# Patient Record
Sex: MALE | Race: BLACK OR AFRICAN AMERICAN | Employment: UNEMPLOYED | URBAN - METROPOLITAN AREA
[De-identification: names, ages, dates, MRNs, and addresses within clinical notes are randomized per-mention and may not be internally consistent; named-entity substitution may affect disease eponyms.]

---

## 2019-03-06 ENCOUNTER — HOSPITAL ENCOUNTER (INPATIENT)
Age: 49
LOS: 5 days | Discharge: HOME | DRG: 750 | End: 2019-03-11
Attending: EMERGENCY MEDICINE | Admitting: PSYCHIATRY & NEUROLOGY
Payer: MEDICAID

## 2019-03-06 DIAGNOSIS — F20.9 SCHIZOPHRENIA, UNSPECIFIED TYPE (HCC): Primary | ICD-10-CM

## 2019-03-06 LAB
AMPHET UR QL SCN: NEGATIVE
ANION GAP SERPL CALC-SCNC: 8 MMOL/L (ref 5–15)
APPEARANCE UR: CLEAR
ATRIAL RATE: 66 BPM
BACTERIA URNS QL MICRO: NEGATIVE /HPF
BARBITURATES UR QL SCN: NEGATIVE
BASOPHILS # BLD: 0.1 K/UL (ref 0–0.1)
BASOPHILS NFR BLD: 1 % (ref 0–1)
BENZODIAZ UR QL: NEGATIVE
BILIRUB UR QL CFM: NEGATIVE
BUN SERPL-MCNC: 8 MG/DL (ref 6–20)
BUN/CREAT SERPL: 8 (ref 12–20)
CALCIUM SERPL-MCNC: 8.5 MG/DL (ref 8.5–10.1)
CALCULATED P AXIS, ECG09: 78 DEGREES
CALCULATED R AXIS, ECG10: 79 DEGREES
CALCULATED T AXIS, ECG11: 74 DEGREES
CANNABINOIDS UR QL SCN: POSITIVE
CHLORIDE SERPL-SCNC: 101 MMOL/L (ref 97–108)
CO2 SERPL-SCNC: 30 MMOL/L (ref 21–32)
COCAINE UR QL SCN: POSITIVE
COLOR UR: ABNORMAL
CREAT SERPL-MCNC: 0.97 MG/DL (ref 0.7–1.3)
DIAGNOSIS, 93000: NORMAL
DIFFERENTIAL METHOD BLD: NORMAL
DRUG SCRN COMMENT,DRGCM: ABNORMAL
EOSINOPHIL # BLD: 0.2 K/UL (ref 0–0.4)
EOSINOPHIL NFR BLD: 3 % (ref 0–7)
EPITH CASTS URNS QL MICRO: ABNORMAL /LPF
ERYTHROCYTE [DISTWIDTH] IN BLOOD BY AUTOMATED COUNT: 14.4 % (ref 11.5–14.5)
ETHANOL SERPL-MCNC: <10 MG/DL
GLUCOSE SERPL-MCNC: 69 MG/DL (ref 65–100)
GLUCOSE UR STRIP.AUTO-MCNC: NEGATIVE MG/DL
HCT VFR BLD AUTO: 47 % (ref 36.6–50.3)
HGB BLD-MCNC: 15.5 G/DL (ref 12.1–17)
HGB UR QL STRIP: NEGATIVE
IMM GRANULOCYTES # BLD AUTO: 0 K/UL (ref 0–0.04)
IMM GRANULOCYTES NFR BLD AUTO: 0 % (ref 0–0.5)
KETONES UR QL STRIP.AUTO: ABNORMAL MG/DL
LEUKOCYTE ESTERASE UR QL STRIP.AUTO: NEGATIVE
LYMPHOCYTES # BLD: 1.3 K/UL (ref 0.8–3.5)
LYMPHOCYTES NFR BLD: 23 % (ref 12–49)
MCH RBC QN AUTO: 27.9 PG (ref 26–34)
MCHC RBC AUTO-ENTMCNC: 33 G/DL (ref 30–36.5)
MCV RBC AUTO: 84.7 FL (ref 80–99)
METHADONE UR QL: NEGATIVE
MONOCYTES # BLD: 0.6 K/UL (ref 0–1)
MONOCYTES NFR BLD: 10 % (ref 5–13)
NEUTS SEG # BLD: 3.7 K/UL (ref 1.8–8)
NEUTS SEG NFR BLD: 63 % (ref 32–75)
NITRITE UR QL STRIP.AUTO: NEGATIVE
NRBC # BLD: 0 K/UL (ref 0–0.01)
NRBC BLD-RTO: 0 PER 100 WBC
OPIATES UR QL: NEGATIVE
P-R INTERVAL, ECG05: 168 MS
PCP UR QL: NEGATIVE
PH UR STRIP: 6 [PH] (ref 5–8)
PLATELET # BLD AUTO: 248 K/UL (ref 150–400)
PMV BLD AUTO: 10.4 FL (ref 8.9–12.9)
POTASSIUM SERPL-SCNC: 3.4 MMOL/L (ref 3.5–5.1)
PROT UR STRIP-MCNC: ABNORMAL MG/DL
Q-T INTERVAL, ECG07: 418 MS
QRS DURATION, ECG06: 80 MS
QTC CALCULATION (BEZET), ECG08: 438 MS
RBC # BLD AUTO: 5.55 M/UL (ref 4.1–5.7)
RBC #/AREA URNS HPF: ABNORMAL /HPF (ref 0–5)
SODIUM SERPL-SCNC: 139 MMOL/L (ref 136–145)
SP GR UR REFRACTOMETRY: 1.02 (ref 1–1.03)
UA: UC IF INDICATED,UAUC: ABNORMAL
UROBILINOGEN UR QL STRIP.AUTO: 1 EU/DL (ref 0.2–1)
VENTRICULAR RATE, ECG03: 66 BPM
WBC # BLD AUTO: 5.8 K/UL (ref 4.1–11.1)
WBC URNS QL MICRO: ABNORMAL /HPF (ref 0–4)

## 2019-03-06 PROCEDURE — 85025 COMPLETE CBC W/AUTO DIFF WBC: CPT

## 2019-03-06 PROCEDURE — 80307 DRUG TEST PRSMV CHEM ANLYZR: CPT

## 2019-03-06 PROCEDURE — 36415 COLL VENOUS BLD VENIPUNCTURE: CPT

## 2019-03-06 PROCEDURE — 90791 PSYCH DIAGNOSTIC EVALUATION: CPT

## 2019-03-06 PROCEDURE — 74011250637 HC RX REV CODE- 250/637: Performed by: PSYCHIATRY & NEUROLOGY

## 2019-03-06 PROCEDURE — 65220000003 HC RM SEMIPRIVATE PSYCH

## 2019-03-06 PROCEDURE — 93005 ELECTROCARDIOGRAM TRACING: CPT

## 2019-03-06 PROCEDURE — 99285 EMERGENCY DEPT VISIT HI MDM: CPT

## 2019-03-06 PROCEDURE — 80048 BASIC METABOLIC PNL TOTAL CA: CPT

## 2019-03-06 PROCEDURE — 81001 URINALYSIS AUTO W/SCOPE: CPT

## 2019-03-06 RX ORDER — LORAZEPAM 1 MG/1
1 TABLET ORAL
Status: DISCONTINUED | OUTPATIENT
Start: 2019-03-06 | End: 2019-03-11 | Stop reason: HOSPADM

## 2019-03-06 RX ORDER — OLANZAPINE 5 MG/1
5 TABLET ORAL
Status: DISCONTINUED | OUTPATIENT
Start: 2019-03-06 | End: 2019-03-11 | Stop reason: HOSPADM

## 2019-03-06 RX ORDER — BENZTROPINE MESYLATE 1 MG/ML
2 INJECTION INTRAMUSCULAR; INTRAVENOUS
Status: DISCONTINUED | OUTPATIENT
Start: 2019-03-06 | End: 2019-03-11 | Stop reason: HOSPADM

## 2019-03-06 RX ORDER — IBUPROFEN 200 MG
1 TABLET ORAL
Status: DISCONTINUED | OUTPATIENT
Start: 2019-03-06 | End: 2019-03-11 | Stop reason: HOSPADM

## 2019-03-06 RX ORDER — LORAZEPAM 2 MG/ML
2 INJECTION INTRAMUSCULAR
Status: DISCONTINUED | OUTPATIENT
Start: 2019-03-06 | End: 2019-03-11 | Stop reason: HOSPADM

## 2019-03-06 RX ORDER — ACETAMINOPHEN 325 MG/1
650 TABLET ORAL
Status: DISCONTINUED | OUTPATIENT
Start: 2019-03-06 | End: 2019-03-11 | Stop reason: HOSPADM

## 2019-03-06 RX ORDER — IBUPROFEN 400 MG/1
400 TABLET ORAL
Status: DISCONTINUED | OUTPATIENT
Start: 2019-03-06 | End: 2019-03-11 | Stop reason: HOSPADM

## 2019-03-06 RX ORDER — BENZTROPINE MESYLATE 2 MG/1
2 TABLET ORAL
Status: DISCONTINUED | OUTPATIENT
Start: 2019-03-06 | End: 2019-03-11 | Stop reason: HOSPADM

## 2019-03-06 RX ORDER — ADHESIVE BANDAGE
30 BANDAGE TOPICAL DAILY PRN
Status: DISCONTINUED | OUTPATIENT
Start: 2019-03-06 | End: 2019-03-11 | Stop reason: HOSPADM

## 2019-03-06 RX ORDER — ZOLPIDEM TARTRATE 10 MG/1
10 TABLET ORAL
Status: DISCONTINUED | OUTPATIENT
Start: 2019-03-06 | End: 2019-03-07

## 2019-03-06 RX ADMIN — ZOLPIDEM TARTRATE 10 MG: 10 TABLET ORAL at 22:45

## 2019-03-06 NOTE — ED NOTES
Pt presents to ED by EMS with c/o hearing voices. Pt reports the voices are saying \"harm yourself\" and \"if no one cares about you just go ahead and do it. \"  Pt reports he has a plan to harm himself using a knife. +SI/-HI  Denies alcohol use but reports using cocaine and marijuana 5 days ago. Reports visual and tactile hallucinations. Reports seeing mermaids, unicorns and \"a lot of things. \"   Pt reports being homeless. Previously resided in apartments off [address] with his ex brother in law and his friend. Reported having issues with the friend saying Nataliya Christopher has my TV and money. He better give it back or ill raise any measures to bust his ass when I get out of here. \" pt reports having two sisters who live locally and one brother who lives out of state. Minimal communication/support from family. Pt receives mental health care at [facility]. Reports taking haldol but wants his dose increased. Pt is alert and cooperative. Shows loose associations and tangential thinking making it challenging to get a detailed history. Pt asked me if I had a knife and then changed to talking about losing his money. Pt belongings were checked and he was placed in a gown without difficulty. Emergency Department Nursing Plan of Care       The Nursing Plan of Care is developed from the Nursing assessment and Emergency Department Attending provider initial evaluation. The plan of care may be reviewed in the ED Provider note.     The Plan of Care was developed with the following considerations:   Patient / Family readiness to learn indicated by:verbalized understanding  Persons(s) to be included in education: patient  Barriers to Learning/Limitations:No    Signed     Guido Fitzgerald    3/6/2019   2:02 PM

## 2019-03-06 NOTE — BSMART NOTE
Comprehensive Assessment Form Part 1      Section I - Disposition    Axis I - Paranoid Schizophrenia, Substance Use Disorder (Alcohol, Marijuana, Cocaine)   Axis II - Deferred  Axis III -   Past Medical History:   Diagnosis Date    Aggressive outburst     Anxiety disorder     Brain tumor ([address]     Akilah ([address]     Other ill-defined conditions(799.89)     Psychotic disorder ([address]     Self mutilation     Substance abuse ([address]     Suicidal thoughts        Axis IV - Medication noncompliance, homeless, substance abuse  [place] [address] - 35      The Medical Doctor to Psychiatrist conference was not completed. The Medical Doctor is in agreement with Psychiatrist disposition because of (reason) Admission is recommended. The plan is admit to [facility]. The on-call Psychiatrist consulted was Dr. Ladona Holstein. The admitting Psychiatrist will be MONIQUE  The admitting Diagnosis is Schizophrenia and Substance Use Disorder. The Payor source is  Medicaid. Section II - Integrated Summary  Summary:  Patient came in from [facility] from [facility] for hallucinations including voices to harm himself with a knife. Patient also reported using cocaine and THC about 5 days ago. Per  at [facility] patient arrived agitated, hallucinating, and suicidal. He was prescreened by crisis per  and was voluntary for admission. Patient is a difficult historian but reported his last admission was in November [facility] Patient is alert and oriented. Patient's speech is tangential and eye contact is poor. Patient describing a visual hallucination when this clinician came in the room about a white girl with a white skates with blonde hair going to a black care to get keys and going to a police station and across a yard and into a child's bookbag. Patient is responding to internal stimuli in the room and laughing out loud commenting about what he is experiencing.   Patient indicated his current medications were Haldol and a \"sleeping pill.\"  He indicated he is not taking his medication at this time and it has been months. Patient reported poor sleep, poor appetite, hallucinations suicidal ideation with various plans. Patient reported he could use a knife to slit his throat, run from a , or do drugs to kill himself. Patient also reported he is homicidal towards God and wants to clip his wings like [employer]. Patient agrees to hospitalization to resume medications. Per  he is supposed to be on Haldol 50 mg IM and Remeron 15 mg qhs. Patient has doctors appointment at [facility] on 3-12-19. The patienthas demonstrated mental capacity to provide informed consent. The information is given by the patient and past medical records. The Chief Complaint is hallucinations and substance. The Precipitant Factors are medication noncompliance. Previous Hospitalizations: Yes  Current Psychiatrist and/or  is Trinidad Llanos     Lethality Assessment:    The potential for suicide noted by the following: active psychosis, defined plan, ideation and current substance abuse . The potential for homicide is noted by the following : psychosis and ideation. Patient stated he wanted to hurt God by clipping his wings off. The patient has not been a perpetrator of sexual or physical abuse. There are pending charges and are listed as: drunk in public. The patient is felt to be at risk for self harm or harm to others. The attending nurse was advised that security has been notified. Section III - Psychosocial  The patient's overall mood and attitude is anxious, agitated. Feelings of helplessness and hopelessness are not observed. Generalized anxiety is not observed. Panic is not observed. Phobias are not observed. Obsessive compulsive tendencies are not observed. Section IV - Mental Status Exam  The patient's appearance is tense. The patient's behavior is restless.  The patient is oriented to time, place, person and situation. The patient's speech is loud at times. The patient's mood is anxious and is irritable. The range of affect is labile. The patient's thought content demonstrates delusions. The thought process is blocking and is tangential.  The patient's perception demonstrated changes in the following:  auditory  visual. The patient's memory shows no evidence of impairment. The patient's appetite is decreased and shows signs of weight loss. The patient's sleep has evidence of insomnia. The patient shows no insight. The patient's judgement is psychologically impaired. Section V - Substance Abuse  The patient is using substances. The patient is using alcohol for with last use on unknown, cannabis by inhalation  with last use on 5 days ago and cocaine by inhalation  } with last use on 5 days ago. The patient has experienced the following withdrawal symptoms: N/A. Section VI - Living Arrangements  The patient is single. The patient lives alone. The patient has no children. The patient does plan to return home upon discharge. The patient does have legal issues pending. The patient's source of income comes from disability. Catholic and cultural practices have not been voiced at this time. The patient's greatest support comes from none reported and this person will not be involved with the treatment. The patient has not been in an event described as horrible or outside the realm of ordinary life experience either currently or in the past.  The patient has not been a victim of sexual/physical abuse. Section VII - Other Areas of Clinical Concern  The highest grade achieved is NA with the overall quality of school experience being described as NA. The patient is currently disabled and speaks [place] as a primary language. The patient has no communication impairments affecting communication. The patient's preference for learning can be described as: can read and write adequately.   The patient's hearing is normal.  The patient's vision is normal.      [facility], [facility]

## 2019-03-06 NOTE — ED NOTES
While performing EKG with BSMART at bedside, pt seemed distracted and agitated. Pt started talking about angels.

## 2019-03-06 NOTE — ED NOTES
TRANSFER - OUT REPORT:    Verbal report given to Krysta RN(name) on César Boucher  being transferred to behavioral health room 309(unit) for routine progression of care       Report consisted of patients Situation, Background, Assessment and   Recommendations(SBAR). Information from the following report(s) SBAR was reviewed with the receiving nurse. Lines:       Opportunity for questions and clarification was provided.       Patient transported with:   security

## 2019-03-06 NOTE — ED PROVIDER NOTES
EMERGENCY DEPARTMENT HISTORY AND PHYSICAL EXAM      Date: 3/6/2019  Patient Name: Emory Anderson    History of Presenting Illness     Chief Complaint   Patient presents with    Mental Health Problem       History Provided By: Patient    HPI: Emory Anderson, 53 y.o. male with PMHx significant for brain tumor, anxiety, self mutilation, substance abuse, sciozphrenia presents ambulance to the ED with cc of hearing voices that are telling him to harm himself. Pt reports a plan of \"using a knife\". Denies HI. Reports he was admitted to inpatient psych in 11/2018 - has not taken psych meds since. Pt admits to occasional alcohol use. Also admits to marijuana and cocaine use. Denies heroin use. Reports trouble sleeping. Denies change in appetite. Pt reports he has been under increased stress recently. Has not f/u psych recently. There are no other complaints, changes, or physical findings at this time. PCP: Dago Albrecht MD    No current facility-administered medications on file prior to encounter. Current Outpatient Medications on File Prior to Encounter   Medication Sig Dispense Refill    cephALEXin (KEFLEX) 500 mg capsule Take 500 mg by mouth four (4) times daily.  HYDROcodone-acetaminophen (NORCO) 5-325 mg per tablet Take 1-2 Tabs by mouth every four (4) hours as needed for Pain.  40 Tab 1       Past History     Past Medical History:  Past Medical History:   Diagnosis Date    Aggressive outburst     Anxiety disorder     Brain tumor ([address]     Akilah ([address]     Other ill-defined conditions(799.89)     Psychotic disorder ([address]     Self mutilation     Substance abuse ([facility]     Suicidal thoughts        Past Surgical History:  Past Surgical History:   Procedure Laterality Date    HX OTHER SURGICAL      brain tumor removal       Family History:  Family History   Problem Relation Age of Onset    Depression Neg Hx     Suicide Neg Hx     Psychotic Disorder Neg Hx     Substance Abuse Neg Hx  Dementia Neg Hx        Social History:  Social History     Tobacco Use    Smoking status: Current Every Day Smoker     Packs/day: 1.50   Substance Use Topics    Alcohol use: Yes     Comment: Beer    Drug use: No       Allergies:  No Known Allergies      Review of Systems   Review of Systems   Constitutional: Negative for chills and fever. HENT: Negative for facial swelling. Eyes: Negative for photophobia and visual disturbance. Respiratory: Negative for shortness of breath. Cardiovascular: Negative for chest pain. Gastrointestinal: Negative for abdominal pain, nausea and vomiting. Genitourinary: Negative for flank pain. Musculoskeletal: Negative for back pain and myalgias. Skin: Negative for color change, pallor, rash and wound. Neurological: Negative for dizziness, weakness, light-headedness and headaches. Psychiatric/Behavioral: Positive for hallucinations, sleep disturbance and suicidal ideas. Negative for agitation, behavioral problems, confusion, decreased concentration, dysphoric mood and self-injury. The patient is not nervous/anxious and is not hyperactive. All other systems reviewed and are negative. Physical Exam   Physical Exam   Constitutional: He is oriented to person, place, and time. He appears well-developed and well-nourished. No distress. HENT:   Head: Normocephalic and atraumatic. Eyes: Conjunctivae are normal.   Cardiovascular: Normal rate, regular rhythm and normal heart sounds. Pulmonary/Chest: Effort normal and breath sounds normal. No respiratory distress. Abdominal: Soft. Bowel sounds are normal. He exhibits no distension. Musculoskeletal: Normal range of motion. Neurological: He is alert and oriented to person, place, and time. Skin: Skin is warm. No rash noted. Psychiatric: He has a normal mood and affect. His behavior is normal. His speech is not rapid and/or pressured. He expresses suicidal ideation. He expresses no homicidal ideation. He expresses suicidal plans. He expresses no homicidal plans. Linear thoughts   Nursing note and vitals reviewed. Diagnostic Study Results     Labs -     Recent Results (from the past 12 hour(s))   URINALYSIS W/ REFLEX CULTURE    Collection Time: 03/06/19  2:08 PM   Result Value Ref Range    Color DARK YELLOW      Appearance CLEAR CLEAR      Specific gravity 1.020 1.003 - 1.030      pH (UA) 6.0 5.0 - 8.0      Protein TRACE (A) NEG mg/dL    Glucose NEGATIVE  NEG mg/dL    Ketone TRACE (A) NEG mg/dL    Blood NEGATIVE  NEG      Urobilinogen 1.0 0.2 - 1.0 EU/dL    Nitrites NEGATIVE  NEG      Leukocyte Esterase NEGATIVE  NEG      WBC 0-4 0 - 4 /hpf    RBC 0-5 0 - 5 /hpf    Epithelial cells FEW FEW /lpf    Bacteria NEGATIVE  NEG /hpf    UA:UC IF INDICATED CULTURE NOT INDICATED BY UA RESULT CNI     DRUG SCREEN, URINE    Collection Time: 03/06/19  2:08 PM   Result Value Ref Range    AMPHETAMINES NEGATIVE  NEG      BARBITURATES NEGATIVE  NEG      BENZODIAZEPINES NEGATIVE  NEG      COCAINE POSITIVE (A) NEG      METHADONE NEGATIVE  NEG      OPIATES NEGATIVE  NEG      PCP(PHENCYCLIDINE) NEGATIVE  NEG      THC (TH-CANNABINOL) POSITIVE (A) NEG      Drug screen comment (NOTE)    BILIRUBIN, CONFIRM    Collection Time: 03/06/19  2:08 PM   Result Value Ref Range    Bilirubin UA, confirm NEGATIVE  NEG     CBC WITH AUTOMATED DIFF    Collection Time: 03/06/19  2:11 PM   Result Value Ref Range    WBC 5.8 4.1 - 11.1 K/uL    RBC 5.55 4.10 - 5.70 M/uL    HGB 15.5 12.1 - 17.0 g/dL    HCT 47.0 36.6 - 50.3 %    MCV 84.7 80.0 - 99.0 FL    MCH 27.9 26.0 - 34.0 PG    MCHC 33.0 30.0 - 36.5 g/dL    RDW 14.4 11.5 - 14.5 %    PLATELET 460 619 - 849 K/uL    MPV 10.4 8.9 - 12.9 FL    NRBC 0.0 0  WBC    ABSOLUTE NRBC 0.00 0.00 - 0.01 K/uL    NEUTROPHILS 63 32 - 75 %    LYMPHOCYTES 23 12 - 49 %    MONOCYTES 10 5 - 13 %    EOSINOPHILS 3 0 - 7 %    BASOPHILS 1 0 - 1 %    IMMATURE GRANULOCYTES 0 0.0 - 0.5 %    ABS.  NEUTROPHILS 3.7 1.8 - 8.0 K/UL    ABS. LYMPHOCYTES 1.3 0.8 - 3.5 K/UL    ABS. MONOCYTES 0.6 0.0 - 1.0 K/UL    ABS. EOSINOPHILS 0.2 0.0 - 0.4 K/UL    ABS. BASOPHILS 0.1 0.0 - 0.1 K/UL    ABS. IMM. GRANS. 0.0 0.00 - 0.04 K/UL    DF AUTOMATED     METABOLIC PANEL, BASIC    Collection Time: 03/06/19  2:11 PM   Result Value Ref Range    Sodium 139 136 - 145 mmol/L    Potassium 3.4 (L) 3.5 - 5.1 mmol/L    Chloride 101 97 - 108 mmol/L    CO2 30 21 - 32 mmol/L    Anion gap 8 5 - 15 mmol/L    Glucose 69 65 - 100 mg/dL    BUN 8 6 - 20 MG/DL    Creatinine 0.97 0.70 - 1.30 MG/DL    BUN/Creatinine ratio 8 (L) 12 - 20      GFR est AA >60 >60 ml/min/1.73m2    GFR est non-AA >60 >60 ml/min/1.73m2    Calcium 8.5 8.5 - 10.1 MG/DL   ETHYL ALCOHOL    Collection Time: 03/06/19  2:11 PM   Result Value Ref Range    ALCOHOL(ETHYL),SERUM <10 <10 MG/DL   EKG, 12 LEAD, INITIAL    Collection Time: 03/06/19  3:12 PM   Result Value Ref Range    Ventricular Rate 66 BPM    Atrial Rate 66 BPM    P-R Interval 168 ms    QRS Duration 80 ms    Q-T Interval 418 ms    QTC Calculation (Bezet) 438 ms    Calculated P Axis 78 degrees    Calculated R Axis 79 degrees    Calculated T Axis 74 degrees    Diagnosis       Normal sinus rhythm  Normal ECG  No previous ECGs available         Radiologic Studies -   No orders to display     CT Results  (Last 48 hours)    None        CXR Results  (Last 48 hours)    None            Medical Decision Making   I am the first provider for this patient. I reviewed the vital signs, available nursing notes, past medical history, past surgical history, family history and social history. Vital Signs-Reviewed the patient's vital signs. Patient Vitals for the past 12 hrs:   Temp Pulse Resp BP SpO2   03/06/19 1707 98.3 °F (36.8 °C) 88 14 102/53 98 %   03/06/19 1334 98.3 °F (36.8 °C) 82 20 120/81 98 %       Pulse Oximetry Analysis - 94% on RA      EKG interpretation: (Preliminary)  Rhythm: normal sinus rhythm; and regular .  Rate (approx.): 66; Axis: normal; HI interval: normal; QRS interval: normal ; ST/T wave: normal; Other findings: normal.    Records Reviewed: Nursing Notes, Old Medical Records and Previous electrocardiograms    Provider Notes (Medical Decision Making):   DDx: Psychosis, SI, Polysubstance abuse, Medication noncompliance    ED Course:   Initial assessment performed. The patients presenting problems have been discussed, and they are in agreement with the care plan formulated and outlined with them. I have encouraged them to ask questions as they arise throughout their visit. Ayesha evaluated pt and spoke with psychiatrist on call. He agreed to admit pt to inpatient psych at [facility]    Disposition:  Pt admitted to inpatient psych at [facility]. PLAN:  1. Current Discharge Medication List        2. Follow-up Information    None       Return to ED if worse     Diagnosis     Clinical Impression:   1.  Schizophrenia, unspecified type ([facility] [address] [facility]

## 2019-03-07 PROCEDURE — 65220000003 HC RM SEMIPRIVATE PSYCH

## 2019-03-07 RX ORDER — TRAZODONE HYDROCHLORIDE 100 MG/1
100 TABLET ORAL
Status: DISCONTINUED | OUTPATIENT
Start: 2019-03-07 | End: 2019-03-11 | Stop reason: HOSPADM

## 2019-03-07 RX ORDER — MIRTAZAPINE 15 MG/1
15 TABLET, FILM COATED ORAL
Status: DISCONTINUED | OUTPATIENT
Start: 2019-03-07 | End: 2019-03-11 | Stop reason: HOSPADM

## 2019-03-07 RX ORDER — HALOPERIDOL 5 MG/1
5 TABLET ORAL EVERY 12 HOURS
Status: DISCONTINUED | OUTPATIENT
Start: 2019-03-07 | End: 2019-03-10

## 2019-03-07 NOTE — PROGRESS NOTES
Problem: Psychosis  Goal: *STG: Remains safe in hospital  Outcome: Progressing Towards Goal  Patient continues to be monitored q 15 miniutes.

## 2019-03-07 NOTE — PROGRESS NOTES
[facility] Admission Pharmacy Medication Reconciliation     Recommendations/Findings:   1) Per chart review, patient hasn't taken medications in months. Spoke with pharmacist at [facility] who stated last haloperidol decanoate 50 mg IM injection was 8/7/18 (dose was 50 mg IM every 4 weeks). Patient was also receiving mirtazapine 15 mg QHS at that time as well. Unable to reach patient's  at this time for corroboration. The following changes were made to the PTA medication list:    Additions: None   Modifications: None   Deletions: cephalexin, hydrocodone-acetaminophen    Total Time Spent: 10 minutes     Information obtained from: Chart review, [facility] @ [facility] ([phone])     Patient allergies:    Allergies as of 03/06/2019    (No Known Allergies)       Prior to admission medications:   None       Thank you,  DENYS Hutchinson, [facility]  [phone]

## 2019-03-07 NOTE — H&P
INITIAL PSYCHIATRIC EVALUATION            IDENTIFICATION:    Patient Name  Yenni So   Date of Birth 1970   [facility] [id number]   Medical Record Number  [id number]      Age  53 y.o. PCP Slade Tate MD   Admit date:  3/6/2019    Room Number  402/98  @ [facility]   Date of Service  3/7/2019            HISTORY         REASON FOR HOSPITALIZATION:  CC: \"psychosis\". Pt admitted under a voluntary basis for severe psychosis proving to be an imminent danger to self and an inability to care for self. HISTORY OF PRESENT ILLNESS:    The patient, Yenni So, is a 53 y.o. BLACK OR  male with a past psychiatric history significant for schizophrenia, who presents at this time with complaints of (and/or evidence of) the following emotional symptoms: delusions and psychosis. Additional symptomatology include flight of ideas. The above symptoms have been present for 2+ weeks. These symptoms are of moderate to high severity. These symptoms are constant in nature. The patient's condition has been precipitated by psychosocial stressors. Patient's condition made worse by continued illicit drug use and treatment noncompliance. UDS: +cocaine, THC; BAL=0. The patient is a poor historian. The patient does not corroborate the above narrative. The patient contracts for safety on the unit and gives consent for the team to contact collateral. The patient is amenable to initiating treatment while on the unit. Patient seen in his room where he is coherent, but preoccupied with coloring a kimberli shirt but reports taking Haldol and Remeron. Per staff patient has been isolative and irritable, no PRNs given for behavior, got Ambien for sleep. ALLERGIES: No Known Allergies   MEDICATIONS PRIOR TO ADMISSION:   No medications prior to admission.       PAST MEDICAL HISTORY:   Past Medical History:   Diagnosis Date    Aggressive outburst     Anxiety disorder     [facility] tumor ([facility] [address]     [place]il[facility] [address]     Other ill-defined conditions(799.89)     Psychotic disorder ([facility] [facility]     Self mutilation     [facility]     Suicidal thoughts      Past Surgical History:   Procedure Laterality Date    HX OTHER SURGICAL      brain tumor removal      SOCIAL HISTORY:   Social History     Socioeconomic History    Marital status: UNKNOWN     Spouse name: Not on file    Number of children: Not on file    Years of education: Not on file    Highest education level: Not on file   Social Needs    Financial resource strain: Not on file    Food insecurity - worry: Not on file    Food insecurity - inability: Not on file   [employer] needs - medical: Not on file   [employer] needs - non-medical: Not on file   Occupational History    Not on file   Tobacco Use    Smoking status: Current Every Day Smoker     Packs/day: 1.50   Substance and Sexual Activity    Alcohol use: Yes     Comment: Beer    Drug use: No    Sexual activity: Not on file   Other Topics Concern    Caffeine Concern Not Asked    Back Care Not Asked    Exercise Not Asked    Occupational Exposure Not Asked    Sleep Concern Not Asked    Stress Concern Not Asked    Weight Concern Not Asked   Social History Narrative    Not on file      FAMILY HISTORY: History reviewed, pertinent family history as below:   Family History   Problem Relation Age of Onset    Depression Neg Hx     Suicide Neg Hx     Psychotic Disorder Neg Hx     Substance Abuse Neg Hx     Dementia Neg Hx        REVIEW OF SYSTEMS:   Pertinent items are noted in the History of Present Illness. All other Systems reviewed and are considered negative.            MENTAL STATUS EXAM & VITALS     MENTAL STATUS EXAM (MSE):    MSE FINDINGS ARE WITHIN NORMAL LIMITS (WNL) UNLESS OTHERWISE STATED BELOW. ( ALL OF THE BELOW CATEGORIES OF THE MSE HAVE BEEN REVIEWED (reviewed 3/7/2019) AND UPDATED AS DEEMED APPROPRIATE )  General Presentation age appropriate, cooperative and vague   Orientation oriented to time, place and person   Vital Signs  See below (reviewed 3/7/2019); Vital Signs (BP, Pulse, & Temp) are within normal limits if not listed below.    Gait and Station Stable/steady, no ataxia   Musculoskeletal System No extrapyramidal symptoms (EPS); no abnormal muscular movements or Tardive Dyskinesia (TD); muscle strength and tone are within normal limits   Language No aphasia or dysarthria   Speech:  normal volume and non-pressured   Thought Processes disorganized; normal rate of thoughts; poor abstract reasoning/computation   Thought Associations loose associations   Thought Content preoccupations   Suicidal Ideations none   Homicidal Ideations none   Mood:  euthymic   Affect:  irritable   Memory recent  intact   Memory remote:  intact   Concentration/Attention:  intact   Fund of Knowledge average   Insight:  poor   Reliability poor   Judgment:  poor          VITALS:     Patient Vitals for the past 24 hrs:   Temp Pulse BP SpO2   03/07/19 0750  89 129/88 98 %   03/06/19 1959 98.1 °F (36.7 °C) 86 108/61 98 %     Wt Readings from Last 3 Encounters:   03/06/19 65.8 kg (145 lb)   04/16/12 69.4 kg (153 lb)   11/15/10 69.9 kg (154 lb)     Temp Readings from Last 3 Encounters:   03/06/19 98.1 °F (36.7 °C)   11/19/10 96.7 °F (35.9 °C)   11/12/10 97.3 °F (36.3 °C)     BP Readings from Last 3 Encounters:   03/07/19 129/88   04/16/12 106/73   11/19/10 104/65     Pulse Readings from Last 3 Encounters:   03/07/19 89   04/16/12 63   11/19/10 (!) 55            DATA     LABORATORY DATA:  Labs Reviewed   METABOLIC PANEL, BASIC - Abnormal; Notable for the following components:       Result Value    Potassium 3.4 (*)     BUN/Creatinine ratio 8 (*)     All other components within normal limits   URINALYSIS W/ REFLEX CULTURE - Abnormal; Notable for the following components:    Protein TRACE (*)     Ketone TRACE (*)     All other components within normal limits   DRUG SCREEN, URINE - Abnormal; Notable for the following components:    COCAINE POSITIVE (*)     THC (TH-CANNABINOL) POSITIVE (*)     All other components within normal limits   CBC WITH AUTOMATED DIFF   ETHYL ALCOHOL   BILIRUBIN, CONFIRM   METABOLIC PANEL, COMPREHENSIVE   GLUCOSE, FASTING   TSH 3RD GENERATION   LIPID PANEL     Admission on 03/06/2019   Component Date Value Ref Range Status    WBC 03/06/2019 5.8  4.1 - 11.1 K/uL Final    RBC 03/06/2019 5.55  4.10 - 5.70 M/uL Final    HGB 03/06/2019 15.5  12.1 - 17.0 g/dL Final    HCT 03/06/2019 47.0  36.6 - 50.3 % Final    MCV 03/06/2019 84.7  80.0 - 99.0 FL Final    MCH 03/06/2019 27.9  26.0 - 34.0 PG Final    MCHC 03/06/2019 33.0  30.0 - 36.5 g/dL Final    RDW 03/06/2019 14.4  11.5 - 14.5 % Final    PLATELET 93/75/8724 815  150 - 400 K/uL Final    MPV 03/06/2019 10.4  8.9 - 12.9 FL Final    NRBC 03/06/2019 0.0  0  WBC Final    ABSOLUTE NRBC 03/06/2019 0.00  0.00 - 0.01 K/uL Final    NEUTROPHILS 03/06/2019 63  32 - 75 % Final    LYMPHOCYTES 03/06/2019 23  12 - 49 % Final    MONOCYTES 03/06/2019 10  5 - 13 % Final    EOSINOPHILS 03/06/2019 3  0 - 7 % Final    BASOPHILS 03/06/2019 1  0 - 1 % Final    IMMATURE GRANULOCYTES 03/06/2019 0  0.0 - 0.5 % Final    ABS. NEUTROPHILS 03/06/2019 3.7  1.8 - 8.0 K/UL Final    ABS. LYMPHOCYTES 03/06/2019 1.3  0.8 - 3.5 K/UL Final    ABS. MONOCYTES 03/06/2019 0.6  0.0 - 1.0 K/UL Final    ABS. EOSINOPHILS 03/06/2019 0.2  0.0 - 0.4 K/UL Final    ABS. BASOPHILS 03/06/2019 0.1  0.0 - 0.1 K/UL Final    ABS. IMM.  GRANS. 03/06/2019 0.0  0.00 - 0.04 K/UL Final    DF 03/06/2019 AUTOMATED    Final    Sodium 03/06/2019 139  136 - 145 mmol/L Final    Potassium 03/06/2019 3.4* 3.5 - 5.1 mmol/L Final    Chloride 03/06/2019 101  97 - 108 mmol/L Final    CO2 03/06/2019 30  21 - 32 mmol/L Final    Anion gap 03/06/2019 8  5 - 15 mmol/L Final    Glucose 03/06/2019 69  65 - 100 mg/dL Final    BUN 03/06/2019 8  6 - 20 MG/DL Final    Creatinine 03/06/2019 0.97  0.70 - 1.30 MG/DL Final    BUN/Creatinine ratio 03/06/2019 8* 12 - 20   Final    GFR est AA 03/06/2019 >60  >60 ml/min/1.73m2 Final    GFR est non-AA 03/06/2019 >60  >60 ml/min/1.73m2 Final    Calcium 03/06/2019 8.5  8.5 - 10.1 MG/DL Final    ALCOHOL(ETHYL),SERUM 03/06/2019 <10  <10 MG/DL Final    Color 03/06/2019 DARK YELLOW    Final    Appearance 03/06/2019 CLEAR  CLEAR   Final    Specific gravity 03/06/2019 1.020  1.003 - 1.030   Final    pH (UA) 03/06/2019 6.0  5.0 - 8.0   Final    Protein 03/06/2019 TRACE* NEG mg/dL Final    Glucose 03/06/2019 NEGATIVE   NEG mg/dL Final    Ketone 03/06/2019 TRACE* NEG mg/dL Final    Blood 03/06/2019 NEGATIVE   NEG   Final    Urobilinogen 03/06/2019 1.0  0.2 - 1.0 EU/dL Final    Nitrites 03/06/2019 NEGATIVE   NEG   Final    Leukocyte Esterase 03/06/2019 NEGATIVE   NEG   Final    WBC 03/06/2019 0-4  0 - 4 /hpf Final    RBC 03/06/2019 0-5  0 - 5 /hpf Final    Epithelial cells 03/06/2019 FEW  FEW /lpf Final    Bacteria 03/06/2019 NEGATIVE   NEG /hpf Final    UA:UC IF INDICATED 03/06/2019 CULTURE NOT INDICATED BY UA RESULT  CNI   Final    AMPHETAMINES 03/06/2019 NEGATIVE   NEG   Final    BARBITURATES 03/06/2019 NEGATIVE   NEG   Final    BENZODIAZEPINES 03/06/2019 NEGATIVE   NEG   Final    COCAINE 03/06/2019 POSITIVE* NEG   Final    METHADONE 03/06/2019 NEGATIVE   NEG   Final    OPIATES 03/06/2019 NEGATIVE   NEG   Final    PCP(PHENCYCLIDINE) 03/06/2019 NEGATIVE   NEG   Final    THC (TH-CANNABINOL) 03/06/2019 POSITIVE* NEG   Final    Drug screen comment 03/06/2019 (NOTE)   Final    Bilirubin UA, confirm 03/06/2019 NEGATIVE   NEG   Final    Ventricular Rate 03/06/2019 66  BPM Final    Atrial Rate 03/06/2019 66  BPM Final    P-R Interval 03/06/2019 168  ms Final    QRS Duration 03/06/2019 80  ms Final    Q-T Interval 03/06/2019 418  ms Final    QTC Calculation (Bezet) 03/06/2019 438  ms Final    Calculated P Axis 03/06/2019 78 degrees Final    Calculated R Axis 03/06/2019 79  degrees Final    Calculated T Axis 03/06/2019 74  degrees Final    Diagnosis 03/06/2019    Final                    Value:Normal sinus rhythm  Normal ECG  No previous ECGs available  Confirmed by Sara Adame ([id number]) on 3/6/2019 11:10:57 PM          RADIOLOGY REPORTS:  No results found for this or any previous visit. No results found. MEDICATIONS       ALL MEDICATIONS  Current Facility-Administered Medications   Medication Dose Route Frequency    ziprasidone (GEODON) 20 mg in sterile water (preservative free) 1 mL injection  20 mg IntraMUSCular BID PRN    OLANZapine (ZyPREXA) tablet 5 mg  5 mg Oral Q6H PRN    benztropine (COGENTIN) tablet 2 mg  2 mg Oral BID PRN    benztropine (COGENTIN) injection 2 mg  2 mg IntraMUSCular BID PRN    LORazepam (ATIVAN) injection 2 mg  2 mg IntraMUSCular Q4H PRN    LORazepam (ATIVAN) tablet 1 mg  1 mg Oral Q4H PRN    zolpidem (AMBIEN) tablet 10 mg  10 mg Oral QHS PRN    acetaminophen (TYLENOL) tablet 650 mg  650 mg Oral Q4H PRN    ibuprofen (MOTRIN) tablet 400 mg  400 mg Oral Q8H PRN    magnesium hydroxide (MILK OF MAGNESIA) 400 mg/5 mL oral suspension 30 mL  30 mL Oral DAILY PRN    nicotine (NICODERM CQ) 21 mg/24 hr patch 1 Patch  1 Patch TransDERmal DAILY PRN      SCHEDULED MEDICATIONS  Current Facility-Administered Medications   Medication Dose Route Frequency                ASSESSMENT & PLAN        The patient, Lesly Shaw, is a 53 y.o.  male who presents at this time for treatment of the following diagnoses:  Patient Active Hospital Problem List:   Schizophrenia ([facility] [address] (3/6/2019)    Assessment: patient with ongoing psychosis and disorganization exacerbated by cocaine use.  History of stable function on Haldol and Remeron, will restart and plan for WOLFE    Plan:  - START Haldol 5 mg Q12H for psychosis, plan to titrate  - START Remeron 15 mg for insomnia/depression  - IGM therapy as tolerated  - Expand database / obtain collateral  - Dispo planning           A coordinated, multidisplinary treatment team (includes the nurse, unit pharmacist,  and writer) round was conducted for this initial evaluation with the patient present. The following regarding medications was addressed during rounds with patient: the risks and benefits of the proposed medication. The patient was given the opportunity to ask questions. Informed consent given to the use of the above medications. I will continue to adjust psychiatric and non-psychiatric medications (see above \"medication\" section and orders section for details) as deemed appropriate & based upon diagnoses and response to treatment. I have reviewed admission (and previous/old) labs and medical tests in the EHR and or transferring hospital documents. I will continue to order blood tests/labs and diagnostic tests as deemed appropriate and review results as they become available (see orders for details). I have reviewed old psychiatric and medical records available in the EHR. I Will order additional psychiatric records from other institutions to further elucidate the nature of patient's psychopathology and review once available. I will gather additional collateral information from friends, family and o/p treatment team to further elucidate the nature of patient's psychopathology and baselline level of psychiatric functioning.       ESTIMATED LENGTH OF STAY:  4-6 days       STRENGTHS:  Access to housing/residential stability, Interpersonal/supportive relationships (family, friends, peers), Knowledge of medications and Awareness of Substance abuse issues                                        SIGNED:    Darren Hill MD  3/7/2019

## 2019-03-07 NOTE — BH NOTES
Pt has been isolative to his room today coming out periodically for meds and meals. Pt responds to internal stimuli. Pt is not easily redirected. Pt has been in his room coloring and drawling on a shirt most of the shift. Pt denies SI, HI, pain or discomfort. Pt has had no behaviors or issues.
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
## 2019-03-07 NOTE — PROGRESS NOTES
Problem: Psychosis  Goal: *STG: Remains safe in hospital  Outcome: Progressing Towards Goal  7p-7a shift   Pt slept 5 hours overnight. Pt refuse labs this morning. Pt mood is anxious with labile affect. Pt is meal and medication compliant. No medical or behavioral concerns this shift. Pt VSS. Ambien 10mg PO at 2245, good effect. Pt showed no signs of distress while asleep. Staff will continue to support and monitor for health and safety.

## 2019-03-08 PROCEDURE — 65220000003 HC RM SEMIPRIVATE PSYCH

## 2019-03-08 PROCEDURE — 74011250637 HC RX REV CODE- 250/637: Performed by: PSYCHIATRY & NEUROLOGY

## 2019-03-08 RX ADMIN — MIRTAZAPINE 15 MG: 15 TABLET, FILM COATED ORAL at 21:53

## 2019-03-08 RX ADMIN — HALOPERIDOL 5 MG: 5 TABLET ORAL at 21:53

## 2019-03-08 RX ADMIN — HALOPERIDOL 5 MG: 5 TABLET ORAL at 08:27

## 2019-03-08 NOTE — BH NOTES
Pt became agitated d/t being woken up for medications; refused Haldol and Remeron. Pt exhibited flight of ideas thought process with rapid, pressured speech. Pt paced in room. Pt eventually calmed down to rest.    Pt had no complaints throughout the night; no distress observed or reported. Pt slept 7 hours.

## 2019-03-08 NOTE — PROGRESS NOTES
Problem: Psychosis  Goal: *STG/LTG: Complies with medication therapy  Outcome: Progressing Towards Goal  Nsg note 7a-7p shift: Pt is alert and oriented. Mood has been irritable. Pt displays a flight of ideas during conversation. Language can be inappropriate at times. During the shift pt made a reference to \"others\" being in his room, as if he's responding to internal stimuli. Pt became agitated when aggravated by another pt during breakfast. Pt did not respond well to redirection from staff. Pt isolates to himself. He spent the majority of the shift resting in his room. Pt refused treatment rounds today. Pt has been med compliant. He did not receive any prn meds on the shift. Pt currently contracts for safety. Staff will continue to monitor pt's safety and offer support as needed.

## 2019-03-08 NOTE — BH NOTES
PSYCHIATRIC PROGRESS NOTE         Patient Name  Rubén Mccabe   Date of Birth 1970   [facility] [id number]   Medical Record Number  [id number]      Age  53 y.o. PCP Monty Valencia MD   Admit date:  3/6/2019    Room Number  011/59  @ [facility]   Date of Service  3/8/2019         E & M PROGRESS NOTE:         HISTORY       CC:  \"psychosis\"  HISTORY OF PRESENT ILLNESS/INTERVAL HISTORY:  (reviewed/updated 3/8/2019). per initial evaluation: The patient, Rubén Mccabe, is a 53 y.o. BLACK OR  male with a past psychiatric history significant for schizophrenia, who presents at this time with complaints of (and/or evidence of) the following emotional symptoms: delusions and psychosis. Additional symptomatology include flight of ideas. The above symptoms have been present for 2+ weeks. These symptoms are of moderate to high severity. These symptoms are constant in nature. The patient's condition has been precipitated by psychosocial stressors. Patient's condition made worse by continued illicit drug use and treatment noncompliance. UDS: +cocaine, THC; BAL=0.      The patient is a poor historian. The patient does not corroborate the above narrative. The patient contracts for safety on the unit and gives consent for the team to contact collateral. The patient is amenable to initiating treatment while on the unit. Patient seen in his room where he is coherent, but preoccupied with coloring a kimberli shirt but reports taking Haldol and Remeron. Per staff patient has been isolative and irritable, no PRNs given for behavior, got Ambien for sleep. 3/08- patient has been isolative to his room, grossly irritable, psychotic, responding to internal stimuli. He got refused HS Haldol and Remeron but took the medication this morning. Patient seen in his room as he refused treatment team. He declines any specific complaints but refuses interview.         SIDE EFFECTS: (reviewed/updated 3/8/2019)  None reported or admitted to. ALLERGIES:(reviewed/updated 3/8/2019)  No Known Allergies   MEDICATIONS PRIOR TO ADMISSION:(reviewed/updated 3/8/2019)  No medications prior to admission. PAST MEDICAL HISTORY: Past medical history from the initial psychiatric evaluation has been reviewed (reviewed/updated 3/8/2019) with no additional updates (I asked patient and no additional past medical history provided). Past Medical History:   Diagnosis Date    Aggressive outburst     Anxiety disorder     [facility] [facility]     Akilah [facility] [facility]     Other ill-defined conditions(799.89)     Psychotic disorder ([facility] [address] [facility]     Self mutilation     [facility]     Suicidal thoughts      Past Surgical History:   Procedure Laterality Date    HX OTHER SURGICAL      brain tumor removal      SOCIAL HISTORY: Social history from the initial psychiatric evaluation has been reviewed (reviewed/updated 3/8/2019) with no additional updates (I asked patient and no additional social history provided).  Social History     Socioeconomic History    Marital status: UNKNOWN     Spouse name: Not on file    Number of children: Not on file    Years of education: Not on file    Highest education level: Not on file   Social Needs    Financial resource strain: Not on file    Food insecurity - worry: Not on file    Food insecurity - inability: Not on file    Transportation needs - medical: Not on file   [employer] Group needs - non-medical: Not on file   Occupational History    Not on file   Tobacco Use    Smoking status: Current Every Day Smoker     Packs/day: 1.50   Substance and Sexual Activity    Alcohol use: Yes     Comment: Beer    Drug use: No    Sexual activity: Not on file   Other Topics Concern    Caffeine Concern Not Asked    Back Care Not Asked    Exercise Not Asked    Occupational Exposure Not Asked    Sleep Concern Not Asked    Stress Concern Not Asked    Weight Concern Not Asked   Social History Narrative    Not on file      FAMILY HISTORY: Family history from the initial psychiatric evaluation has been reviewed (reviewed/updated 3/8/2019) with no additional updates (I asked patient and no additional family history provided). Family History   Problem Relation Age of Onset    Depression Neg Hx     Suicide Neg Hx     Psychotic Disorder Neg Hx     Substance Abuse Neg Hx     Dementia Neg Hx        REVIEW OF SYSTEMS: (reviewed/updated 3/8/2019)  Appetite:no change from normal   Sleep: no change   All other Review of Systems: Negative except per HPI         [facility] (MSE):    MSE FINDINGS ARE WITHIN NORMAL LIMITS (WNL) UNLESS OTHERWISE STATED BELOW. ( ALL OF THE BELOW CATEGORIES OF THE MSE HAVE BEEN REVIEWED (reviewed 3/8/2019) AND UPDATED AS DEEMED APPROPRIATE )  General Presentation age appropriate, cooperative   Orientation oriented to time, place and person   Vital Signs  See below (reviewed 3/8/2019); Vital Signs (BP, Pulse, & Temp) are within normal limits if not listed below.    Gait and Station Stable/steady, no ataxia   Musculoskeletal System No extrapyramidal symptoms (EPS); no abnormal muscular movements or Tardive Dyskinesia (TD); muscle strength and tone are within normal limits   Language No aphasia or dysarthria   Speech:  normal volume and non-pressured   Thought Processes illogical; normal rate of thoughts; poor abstract reasoning/computation   Thought Associations circumstantial and loose associations   Thought Content internally preoccupied   Suicidal Ideations none   Homicidal Ideations none   Mood:  irritable   Affect:  labile and mood-congruent   Memory recent  intact   Memory remote:  intact   Concentration/Attention:  intact   Fund of Knowledge average   Insight:  limited   Reliability poor   Judgment:  poor          VITALS:     Patient Vitals for the past 24 hrs:   Temp Pulse BP SpO2   03/08/19 0734 97.9 °F (36.6 °C) 66 99/63 100 %     Wt Readings from Last 3 Encounters:   03/06/19 65.8 kg (145 lb)   04/16/12 69.4 kg (153 lb)   11/15/10 69.9 kg (154 lb)     Temp Readings from Last 3 Encounters:   03/08/19 97.9 °F (36.6 °C)   11/19/10 96.7 °F (35.9 °C)   11/12/10 97.3 °F (36.3 °C)     BP Readings from Last 3 Encounters:   03/08/19 99/63   04/16/12 106/73   11/19/10 104/65     Pulse Readings from Last 3 Encounters:   03/08/19 66   04/16/12 63   11/19/10 (!) 55            DATA     LABORATORY DATA:(reviewed/updated 3/8/2019)  No results found for this or any previous visit (from the past 24 hour(s)). No results found for: VALF2, VALAC, VALP, VALPR, DS6, CRBAM, CRBAMP, CARB2, XCRBAM  No results found for: LITHM   RADIOLOGY REPORTS:(reviewed/updated 3/8/2019)  No results found.        MEDICATIONS     ALL MEDICATIONS:   Current Facility-Administered Medications   Medication Dose Route Frequency    haloperidol (HALDOL) tablet 5 mg  5 mg Oral Q12H    mirtazapine (REMERON) tablet 15 mg  15 mg Oral QHS    traZODone (DESYREL) tablet 100 mg  100 mg Oral QHS PRN    ziprasidone (GEODON) 20 mg in sterile water (preservative free) 1 mL injection  20 mg IntraMUSCular BID PRN    OLANZapine (ZyPREXA) tablet 5 mg  5 mg Oral Q6H PRN    benztropine (COGENTIN) tablet 2 mg  2 mg Oral BID PRN    benztropine (COGENTIN) injection 2 mg  2 mg IntraMUSCular BID PRN    LORazepam (ATIVAN) injection 2 mg  2 mg IntraMUSCular Q4H PRN    LORazepam (ATIVAN) tablet 1 mg  1 mg Oral Q4H PRN    acetaminophen (TYLENOL) tablet 650 mg  650 mg Oral Q4H PRN    ibuprofen (MOTRIN) tablet 400 mg  400 mg Oral Q8H PRN    magnesium hydroxide (MILK OF MAGNESIA) 400 mg/5 mL oral suspension 30 mL  30 mL Oral DAILY PRN    nicotine (NICODERM CQ) 21 mg/24 hr patch 1 Patch  1 Patch TransDERmal DAILY PRN      SCHEDULED MEDICATIONS:   Current Facility-Administered Medications   Medication Dose Route Frequency    haloperidol (HALDOL) tablet 5 mg  5 mg Oral Q12H    mirtazapine (REMERON) tablet 15 mg  15 mg Oral QHS          ASSESSMENT & PLAN     DIAGNOSES REQUIRING ACTIVE TREATMENT AND MONITORING: (reviewed/updated 3/8/2019)  Patient Active Hospital Problem List:   Schizophrenia [facility]) (3/6/2019)    Assessment: patient with ongoing psychosis and disorganization exacerbated by cocaine use. History of stable function on Haldol and Remeron, will restart and plan for WOLFE    Plan:  - CONTINUE Haldol 5 mg Q12H for psychosis, plan to titrate  - CONTINUE Remeron 15 mg for insomnia/depression  - IGM therapy as tolerated  - Expand database / obtain collateral  - Dispo planning  In summary, Forest Boswell, is a 53 y.o.  male who presents with a severe exacerbation of the principal diagnosis of Schizophrenia ([facility] [address] [facility]    Patient's condition is not improving. Patient requires continued inpatient hospitalization for further stabilization, safety monitoring and medication management. I will continue to coordinate the provision of individual, milieu, occupational, group, and substance abuse therapies to address target symptoms/diagnoses as deemed appropriate for the individual patient. A coordinated, multidisplinary treatment team round was conducted with the patient (this team consists of the nurse, psychiatric unit pharmcist,  and writer). Complete current electronic health record for patient has been reviewed today including consultant notes, ancillary staff notes, nurses and psychiatric tech notes. Suicide risk assessment completed and patient deemed to be of low risk for suicide at this time. The following regarding medications was addressed during rounds with patient:   the risks and benefits of the proposed medication. The patient was given the opportunity to ask questions. Informed consent given to the use of the above medications.  Will continue to adjust psychiatric and non-psychiatric medications (see above \"medication\" section and orders section for details) as deemed appropriate & based upon diagnoses and response to treatment. I will continue to order blood tests/labs and diagnostic tests as deemed appropriate and review results as they become available (see orders for details and above listed lab/test results). I will order psychiatric records from previous [facility] hospitals to further elucidate the nature of patient's psychopathology and review once available. I will gather additional collateral information from friends, family and o/p treatment team to further elucidate the nature of patient's psychopathology and baselline level of psychiatric functioning. I certify that this patient's inpatient psychiatric hospital services furnished since the previous certification were, and continue to be, required for treatment that could reasonably be expected to improve the patient's condition, or for diagnostic study, and that the patient continues to need, on a daily basis, active treatment furnished directly by or requiring the supervision of inpatient psychiatric facility personnel. In addition, the hospital records show that services furnished were intensive treatment services, admission or related services, or equivalent services.     EXPECTED DISCHARGE DATE/DAY: TBD     DISPOSITION: Home       Signed By:   Laura Perez MD  3/8/2019

## 2019-03-08 NOTE — PROGRESS NOTES
Laboratory monitoring for mood stabilizer and antipsychotics:    Recommended baseline monitoring has NOT been completed based on this patient's current medication regimen. The following labs have been ordered to complete baseline monitoring: lipids, TSH, A1C, CMP     The patient is currently taking the following medication(s):   Current Facility-Administered Medications   Medication Dose Route Frequency    haloperidol (HALDOL) tablet 5 mg  5 mg Oral Q12H    mirtazapine (REMERON) tablet 15 mg  15 mg Oral QHS     Height, Weight, BMI Estimation  Estimated body mass index is 21.41 kg/m² as calculated from the following:    Height as of this encounter: 175.3 cm (69\"). Weight as of this encounter: 65.8 kg (145 lb). Renal Function, Hepatic Function and Chemistry  Estimated Creatinine Clearance: 87.6 mL/min (based on SCr of 0.97 mg/dL). Lab Results   Component Value Date/Time    Sodium 139 03/06/2019 02:11 PM    Potassium 3.4 (L) 03/06/2019 02:11 PM    Chloride 101 03/06/2019 02:11 PM    CO2 30 03/06/2019 02:11 PM    Anion gap 8 03/06/2019 02:11 PM    Glucose 69 03/06/2019 02:11 PM    BUN 8 03/06/2019 02:11 PM    Creatinine 0.97 03/06/2019 02:11 PM    BUN/Creatinine ratio 8 (L) 03/06/2019 02:11 PM    GFR est AA >60 03/06/2019 02:11 PM    GFR est non-AA >60 03/06/2019 02:11 PM    Calcium 8.5 03/06/2019 02:11 PM    ALT (SGPT) 14 11/12/2010 01:48 PM    AST (SGOT) 17 11/12/2010 01:48 PM    Alk.  phosphatase 88 11/12/2010 01:48 PM    Protein, total 8.1 11/12/2010 01:48 PM    Albumin 3.6 11/12/2010 01:48 PM    Globulin 4.5 (H) 11/12/2010 01:48 PM    A-G Ratio 0.8 (L) 11/12/2010 01:48 PM    Bilirubin, total 0.4 11/12/2010 01:48 PM       Lab Results   Component Value Date/Time    Glucose 69 03/06/2019 02:11 PM     Hematology  Lab Results   Component Value Date/Time    WBC 5.8 03/06/2019 02:11 PM    HGB 15.5 03/06/2019 02:11 PM    HCT 47.0 03/06/2019 02:11 PM    PLATELET 324 52/97/5127 02:11 PM    MCV 84.7 03/06/2019 02:11 PM       Vitals  Visit Vitals  BP 99/63   Pulse 66   Temp 97.9 °F (36.6 °C)   Resp 17   Ht 175.3 cm (69\")   Wt 65.8 kg (145 lb)   SpO2 100%   BMI 21.41 kg/m²       Obdulia Berg, PharmD  [phone] (pharmacy)

## 2019-03-08 NOTE — BH NOTES
Social Work      Patient did not attend social work process group.           ALISSON Martin, Supervisee in Social Work

## 2019-03-09 PROCEDURE — 65220000003 HC RM SEMIPRIVATE PSYCH

## 2019-03-09 PROCEDURE — 74011250637 HC RX REV CODE- 250/637: Performed by: PSYCHIATRY & NEUROLOGY

## 2019-03-09 RX ADMIN — MIRTAZAPINE 15 MG: 15 TABLET, FILM COATED ORAL at 21:05

## 2019-03-09 RX ADMIN — HALOPERIDOL 5 MG: 5 TABLET ORAL at 08:20

## 2019-03-09 RX ADMIN — HALOPERIDOL 5 MG: 5 TABLET ORAL at 21:05

## 2019-03-09 NOTE — BH NOTES
Pt was isolative to bed the entire shift. Withdrawn and not interacting with peers/staff. Calm/cooperative with taking medications. Pt had no complaints throughout the night; no distress observed or reported. Pt slept 7 hours.

## 2019-03-09 NOTE — PROGRESS NOTES
Problem: Psychosis  Goal: *STG: Remains safe in hospital  Outcome: Progressing Towards Goal  Patient is medication and meal compliant. Preoccupied. Denies SI/HI. Reports auditory hallucinations. Will continue to monitor patient and assess needs.

## 2019-03-09 NOTE — BH NOTES
Patient requested to use personal soap which was locked in patient belonging closet. Patient informed of policy regarding personal toiletries. Patient became increasingly agitated when informed we would need to speak with the doctor regarding matter. Patient went to room and slammed door. Requested that nursing supervisor come speak with patient. Following nursing supervisor speaking with patient Dr. Ros Grier called and gave verbal order that patient is allowed to use personal soap if it is in original package. Patient given personal soap per orders.

## 2019-03-09 NOTE — BH NOTES
Patient is requesting his personal soap which he brought into the hospital with him. Nursing Supervisor was called to the unit to speak with the patient. The patient is sitting in the hallway. The patients nurse & myself went to speak with the patient. The patient is reporting that he was provided baby soap & needs his soap which he brought with him. The patient is rambling about his jeans that he was provided & that they are washed with soap. The patient was told that we couldn't give him his personal soap unless he had an order from the physician. The patient was told that we would get an order. The patient is not understanding what I'm explaining to him. He was told numerous times that we would get an order for his soap. The patient is becoming loud while talking, has stood out his seat & came closer to nursing supervisor. MD was contacted for order for soap.

## 2019-03-09 NOTE — BH NOTES
PSYCHIATRIC PROGRESS NOTE         Patient Name  Evan Echevarria   Date of Birth 1970   [facility] [id number]   Medical Record Number  [id number]      Age  53 y.o. PCP Daryl Wang MD   Admit date:  3/6/2019    Room Number  289/57  @ [facility]   Date of Service  3/9/2019         E & M PROGRESS NOTE:         HISTORY       CC:  \"psychosis\"  HISTORY OF PRESENT ILLNESS/INTERVAL HISTORY:  (reviewed/updated 3/9/2019). per initial evaluation: The patient, Evan Echevarria, is a 53 y.o. BLACK OR  male with a past psychiatric history significant for schizophrenia, who presents at this time with complaints of (and/or evidence of) the following emotional symptoms: delusions and psychosis. Additional symptomatology include flight of ideas. The above symptoms have been present for 2+ weeks. These symptoms are of moderate to high severity. These symptoms are constant in nature. The patient's condition has been precipitated by psychosocial stressors. Patient's condition made worse by continued illicit drug use and treatment noncompliance. UDS: +cocaine, THC; BAL=0.      The patient is a poor historian. The patient does not corroborate the above narrative. The patient contracts for safety on the unit and gives consent for the team to contact collateral. The patient is amenable to initiating treatment while on the unit. Patient seen in his room where he is coherent, but preoccupied with coloring a kimbelri shirt but reports taking Haldol and Remeron. Per staff patient has been isolative and irritable, no PRNs given for behavior, got Ambien for sleep. 3/08- patient has been isolative to his room, grossly irritable, psychotic, responding to internal stimuli. He got refused HS Haldol and Remeron but took the medication this morning. Patient seen in his room as he refused treatment team. He declines any specific complaints but refuses interview. 3/09-Pt did come to the team room.  Feels Remeron is not helping. Per staff, he is less isolative. Reports, he used be on Haldol depot administered at [facility] [place], [place] and would like to switch to depot prior to discharge. Denies SI. SIDE EFFECTS: (reviewed/updated 3/9/2019)  None reported or admitted to. ALLERGIES:(reviewed/updated 3/9/2019)  No Known Allergies   MEDICATIONS PRIOR TO ADMISSION:(reviewed/updated 3/9/2019)  No medications prior to admission. PAST MEDICAL HISTORY: Past medical history from the initial psychiatric evaluation has been reviewed (reviewed/updated 3/9/2019) with no additional updates (I asked patient and no additional past medical history provided). Past Medical History:   Diagnosis Date    Aggressive outburst     Anxiety disorder     [facility]     Akilah ([facility] [facility]     Other ill-defined conditions(799.89)     Psychotic disorder ([facility] [facility]     Self mutilation     Substance abuse ([facility] [facility]     Suicidal thoughts      Past Surgical History:   Procedure Laterality Date    HX OTHER SURGICAL      brain tumor removal      SOCIAL HISTORY: Social history from the initial psychiatric evaluation has been reviewed (reviewed/updated 3/9/2019) with no additional updates (I asked patient and no additional social history provided).    Social History     Socioeconomic History    Marital status: UNKNOWN     Spouse name: Not on file    Number of children: Not on file    Years of education: Not on file    Highest education level: Not on file   Social Needs    Financial resource strain: Not on file    Food insecurity - worry: Not on file    Food insecurity - inability: Not on file   [employer] needs - medical: Not on file   [employer] needs - non-medical: Not on file   Occupational History    Not on file   Tobacco Use    Smoking status: Current Every Day Smoker     Packs/day: 1.50   Substance and Sexual Activity    Alcohol use: Yes     Comment: Beer    Drug use: No    Sexual activity: Not on file   Other Topics Concern    Caffeine Concern Not Asked    Back Care Not Asked    Exercise Not Asked    Occupational Exposure Not Asked    Sleep Concern Not Asked    Stress Concern Not Asked    Weight Concern Not Asked   Social History Narrative    Not on file      FAMILY HISTORY: Family history from the initial psychiatric evaluation has been reviewed (reviewed/updated 3/9/2019) with no additional updates (I asked patient and no additional family history provided). Family History   Problem Relation Age of Onset    Depression Neg Hx     Suicide Neg Hx     Psychotic Disorder Neg Hx     Substance Abuse Neg Hx     Dementia Neg Hx        REVIEW OF SYSTEMS: (reviewed/updated 3/9/2019)  Appetite:no change from normal   Sleep: no change   All other Review of Systems: Negative except per HPI         [facility] (MSE):    MSE FINDINGS ARE WITHIN NORMAL LIMITS (WNL) UNLESS OTHERWISE STATED BELOW. ( ALL OF THE BELOW CATEGORIES OF THE MSE HAVE BEEN REVIEWED (reviewed 3/9/2019) AND UPDATED AS DEEMED APPROPRIATE )  General Presentation age appropriate, cooperative   Orientation oriented to time, place and person   Vital Signs  See below (reviewed 3/9/2019); Vital Signs (BP, Pulse, & Temp) are within normal limits if not listed below.    Gait and Station Stable/steady, no ataxia   Musculoskeletal System No extrapyramidal symptoms (EPS); no abnormal muscular movements or Tardive Dyskinesia (TD); muscle strength and tone are within normal limits   Language No aphasia or dysarthria   Speech:  normal volume and non-pressured   Thought Processes illogical; normal rate of thoughts; poor abstract reasoning/computation   Thought Associations circumstantial and loose associations   Thought Content internally preoccupied   Suicidal Ideations none   Homicidal Ideations none   Mood:  irritable   Affect:  labile and mood-congruent   Memory recent  intact   Memory remote:  intact   Concentration/Attention:  intact   AQS of Knowledge average   Insight:  limited   Reliability poor   Judgment:  poor          VITALS:     Patient Vitals for the past 24 hrs:   Pulse Resp BP SpO2   03/09/19 0814 66 18 109/64 100 %     Wt Readings from Last 3 Encounters:   03/06/19 65.8 kg (145 lb)   04/16/12 69.4 kg (153 lb)   11/15/10 69.9 kg (154 lb)     Temp Readings from Last 3 Encounters:   03/08/19 97.9 °F (36.6 °C)   11/19/10 96.7 °F (35.9 °C)   11/12/10 97.3 °F (36.3 °C)     BP Readings from Last 3 Encounters:   03/09/19 109/64   04/16/12 106/73   11/19/10 104/65     Pulse Readings from Last 3 Encounters:   03/09/19 66   04/16/12 63   11/19/10 (!) 55            DATA     LABORATORY DATA:(reviewed/updated 3/9/2019)  No results found for this or any previous visit (from the past 24 hour(s)). No results found for: VALF2, VALAC, VALP, VALPR, DS6, CRBAM, CRBAMP, CARB2, XCRBAM  No results found for: LITHM   RADIOLOGY REPORTS:(reviewed/updated 3/9/2019)  No results found.        MEDICATIONS     ALL MEDICATIONS:   Current Facility-Administered Medications   Medication Dose Route Frequency    haloperidol (HALDOL) tablet 5 mg  5 mg Oral Q12H    mirtazapine (REMERON) tablet 15 mg  15 mg Oral QHS    traZODone (DESYREL) tablet 100 mg  100 mg Oral QHS PRN    ziprasidone (GEODON) 20 mg in sterile water (preservative free) 1 mL injection  20 mg IntraMUSCular BID PRN    OLANZapine (ZyPREXA) tablet 5 mg  5 mg Oral Q6H PRN    benztropine (COGENTIN) tablet 2 mg  2 mg Oral BID PRN    benztropine (COGENTIN) injection 2 mg  2 mg IntraMUSCular BID PRN    LORazepam (ATIVAN) injection 2 mg  2 mg IntraMUSCular Q4H PRN    LORazepam (ATIVAN) tablet 1 mg  1 mg Oral Q4H PRN    acetaminophen (TYLENOL) tablet 650 mg  650 mg Oral Q4H PRN    ibuprofen (MOTRIN) tablet 400 mg  400 mg Oral Q8H PRN    magnesium hydroxide (MILK OF MAGNESIA) 400 mg/5 mL oral suspension 30 mL  30 mL Oral DAILY PRN    nicotine (NICODERM CQ) 21 mg/24 hr patch 1 Patch  1 Patch TransDERmal DAILY PRN      SCHEDULED MEDICATIONS:   Current Facility-Administered Medications   Medication Dose Route Frequency    haloperidol (HALDOL) tablet 5 mg  5 mg Oral Q12H    mirtazapine (REMERON) tablet 15 mg  15 mg Oral QHS          ASSESSMENT & PLAN     DIAGNOSES REQUIRING ACTIVE TREATMENT AND MONITORING: (reviewed/updated 3/9/2019)  Patient Active Hospital Problem List:   Schizophrenia [facility]) (3/6/2019)    Assessment: patient with ongoing psychosis and disorganization exacerbated by cocaine use. History of stable function on Haldol and Remeron, will restart and plan for WOLFE    Plan:  - CONTINUE Haldol 5 mg Q12H for psychosis, plan to titrate  - CONTINUE Remeron 15 mg for insomnia/depression  - IGM therapy as tolerated  - Expand database / obtain collateral  - Dispo planning  In summary, Frank Prakash, is a 53 y.o.  male who presents with a severe exacerbation of the principal diagnosis of Schizophrenia ([facility] [address]    Patient's condition is not improving. Patient requires continued inpatient hospitalization for further stabilization, safety monitoring and medication management. I will continue to coordinate the provision of individual, milieu, occupational, group, and substance abuse therapies to address target symptoms/diagnoses as deemed appropriate for the individual patient. A coordinated, multidisplinary treatment team round was conducted with the patient (this team consists of the nurse, psychiatric unit pharmcist,  and writer). Complete current electronic health record for patient has been reviewed today including consultant notes, ancillary staff notes, nurses and psychiatric tech notes. Suicide risk assessment completed and patient deemed to be of low risk for suicide at this time. The following regarding medications was addressed during rounds with patient:   the risks and benefits of the proposed medication. The patient was given the opportunity to ask questions.  Informed consent given to the use of the above medications. Will continue to adjust psychiatric and non-psychiatric medications (see above \"medication\" section and orders section for details) as deemed appropriate & based upon diagnoses and response to treatment. I will continue to order blood tests/labs and diagnostic tests as deemed appropriate and review results as they become available (see orders for details and above listed lab/test results). I will order psychiatric records from previous [facility] hospitals to further elucidate the nature of patient's psychopathology and review once available. I will gather additional collateral information from friends, family and o/p treatment team to further elucidate the nature of patient's psychopathology and baselline level of psychiatric functioning. I certify that this patient's inpatient psychiatric hospital services furnished since the previous certification were, and continue to be, required for treatment that could reasonably be expected to improve the patient's condition, or for diagnostic study, and that the patient continues to need, on a daily basis, active treatment furnished directly by or requiring the supervision of inpatient psychiatric facility personnel. In addition, the hospital records show that services furnished were intensive treatment services, admission or related services, or equivalent services.     EXPECTED DISCHARGE DATE/DAY: TBD     DISPOSITION: Home       Signed By:   Merna Morgan MD  3/9/2019

## 2019-03-09 NOTE — BH NOTES
Pt isolated to room resting. Very little interaction when pt took meds but was compliant with taking his Haldol and Remeron. Pt then rested in bed. Pt had no complaints throughout the night; no distress observed or reported. Pt slept 7.5 hours. Radu Tejeda

## 2019-03-10 PROCEDURE — 74011250637 HC RX REV CODE- 250/637: Performed by: PSYCHIATRY & NEUROLOGY

## 2019-03-10 PROCEDURE — 65220000003 HC RM SEMIPRIVATE PSYCH

## 2019-03-10 RX ORDER — HALOPERIDOL 5 MG/1
10 TABLET ORAL
Status: DISCONTINUED | OUTPATIENT
Start: 2019-03-10 | End: 2019-03-11 | Stop reason: HOSPADM

## 2019-03-10 RX ORDER — HALOPERIDOL 5 MG/1
5 TABLET ORAL DAILY
Status: DISCONTINUED | OUTPATIENT
Start: 2019-03-11 | End: 2019-03-11 | Stop reason: HOSPADM

## 2019-03-10 RX ADMIN — HALOPERIDOL 10 MG: 5 TABLET ORAL at 21:20

## 2019-03-10 RX ADMIN — MIRTAZAPINE 15 MG: 15 TABLET, FILM COATED ORAL at 21:20

## 2019-03-10 RX ADMIN — HALOPERIDOL 5 MG: 5 TABLET ORAL at 12:45

## 2019-03-10 NOTE — BH NOTES
Pt was isolative to bed and appeared to be asleep. Woke up to receive medications and took them without any adverse event. Offered pt hygiene supplies and inquired if pt needed help with anything; pt declined. Informed pt staff was available if he needed anything and encouraged to make a request.    Pt refused lab draw. Pt had no complaints throughout the night; no distress observed or reported. Pt slept 8 hours.

## 2019-03-10 NOTE — BH NOTES
PSYCHIATRIC PROGRESS NOTE         Patient Name  Forest Boswell   Date of Birth 1970   [facility] [id number]   Medical Record Number  [id number]      Age  53 y.o. PCP Caroline Polanco MD   Admit date:  3/6/2019    Room Number  443/40  @ [facility]   Date of Service  3/10/2019         E & M PROGRESS NOTE:         HISTORY       CC:  \"psychosis\"  HISTORY OF PRESENT ILLNESS/INTERVAL HISTORY:  (reviewed/updated 3/10/2019). per initial evaluation: The patient, Forest Boswell, is a 53 y.o. BLACK OR  male with a past psychiatric history significant for schizophrenia, who presents at this time with complaints of (and/or evidence of) the following emotional symptoms: delusions and psychosis. Additional symptomatology include flight of ideas. The above symptoms have been present for 2+ weeks. These symptoms are of moderate to high severity. These symptoms are constant in nature. The patient's condition has been precipitated by psychosocial stressors. Patient's condition made worse by continued illicit drug use and treatment noncompliance. UDS: +cocaine, THC; BAL=0.      The patient is a poor historian. The patient does not corroborate the above narrative. The patient contracts for safety on the unit and gives consent for the team to contact collateral. The patient is amenable to initiating treatment while on the unit. Patient seen in his room where he is coherent, but preoccupied with coloring a kimberli shirt but reports taking Haldol and Remeron. Per staff patient has been isolative and irritable, no PRNs given for behavior, got Ambien for sleep. 3/08- patient has been isolative to his room, grossly irritable, psychotic, responding to internal stimuli. He got refused HS Haldol and Remeron but took the medication this morning. Patient seen in his room as he refused treatment team. He declines any specific complaints but refuses interview. 3/09-Pt did come to the team room.  Feels Remeron is not helping. Per staff, he is less isolative. Reports, he used be on Haldol depot administered at [facility] and would like to switch to depot prior to discharge. Denies SI.  3/10-Refused scheduled Haldol this morning, Continues to insist that he rather take the Depot injection. Requests dosage increase. Agreed to take the morning missed dosage. Has a court date coming up and needs help to confirm it. SIDE EFFECTS: (reviewed/updated 3/10/2019)  None reported or admitted to. ALLERGIES:(reviewed/updated 3/10/2019)  No Known Allergies   MEDICATIONS PRIOR TO ADMISSION:(reviewed/updated 3/10/2019)  No medications prior to admission. PAST MEDICAL HISTORY: Past medical history from the initial psychiatric evaluation has been reviewed (reviewed/updated 3/10/2019) with no additional updates (I asked patient and no additional past medical history provided). Past Medical History:   Diagnosis Date    Aggressive outburst     Anxiety disorder     Brain tumor ([facility]     Akilah ([address]     Other ill-defined conditions(799.89)     Psychotic disorder ([facility]     Self mutilation     Substance abuse ([facility]     Suicidal thoughts      Past Surgical History:   Procedure Laterality Date    HX OTHER SURGICAL      brain tumor removal      SOCIAL HISTORY: Social history from the initial psychiatric evaluation has been reviewed (reviewed/updated 3/10/2019) with no additional updates (I asked patient and no additional social history provided).    Social History     Socioeconomic History    Marital status: UNKNOWN     Spouse name: Not on file    Number of children: Not on file    Years of education: Not on file    Highest education level: Not on file   Social Needs    Financial resource strain: Not on file    Food insecurity - worry: Not on file    Food insecurity - inability: Not on file   [employer] needs - medical: Not on file   [employer] needs - non-medical: Not on file   Occupational History    Not on file   Tobacco Use    Smoking status: Current Every Day Smoker     Packs/day: 1.50   Substance and Sexual Activity    Alcohol use: Yes     Comment: Beer    Drug use: No    Sexual activity: Not on file   Other Topics Concern    Caffeine Concern Not Asked    Back Care Not Asked    Exercise Not Asked    Occupational Exposure Not Asked    Sleep Concern Not Asked    Stress Concern Not Asked    Weight Concern Not Asked   Social History Narrative    Not on file      FAMILY HISTORY: Family history from the initial psychiatric evaluation has been reviewed (reviewed/updated 3/10/2019) with no additional updates (I asked patient and no additional family history provided). Family History   Problem Relation Age of Onset    Depression Neg Hx     Suicide Neg Hx     Psychotic Disorder Neg Hx     Substance Abuse Neg Hx     Dementia Neg Hx        REVIEW OF SYSTEMS: (reviewed/updated 3/10/2019)  Appetite:no change from normal   Sleep: no change   All other Review of Systems: Negative except per HPI         [facility] (MSE):    MSE FINDINGS ARE WITHIN NORMAL LIMITS (WNL) UNLESS OTHERWISE STATED BELOW. ( ALL OF THE BELOW CATEGORIES OF THE MSE HAVE BEEN REVIEWED (reviewed 3/10/2019) AND UPDATED AS DEEMED APPROPRIATE )  General Presentation age appropriate, cooperative   Orientation oriented to time, place and person   Vital Signs  See below (reviewed 3/10/2019); Vital Signs (BP, Pulse, & Temp) are within normal limits if not listed below.    Gait and Station Stable/steady, no ataxia   Musculoskeletal System No extrapyramidal symptoms (EPS); no abnormal muscular movements or Tardive Dyskinesia (TD); muscle strength and tone are within normal limits   Language No aphasia or dysarthria   Speech:  normal volume and non-pressured   Thought Processes illogical; normal rate of thoughts; poor abstract reasoning/computation   Thought Associations circumstantial and loose associations   Thought Content internally preoccupied   Suicidal Ideations none   Homicidal Ideations none   Mood:  irritable   Affect:  labile and mood-congruent   Memory recent  intact   Memory remote:  intact   Concentration/Attention:  intact   Fund of Knowledge average   Insight:  limited   Reliability poor   Judgment:  poor          VITALS:     Patient Vitals for the past 24 hrs:   Temp Pulse Resp BP SpO2   03/10/19 0803 97.9 °F (36.6 °C) 73 17 93/57 100 %   03/09/19 1928 98.3 °F (36.8 °C) 79 18 106/69 100 %     Wt Readings from Last 3 Encounters:   03/06/19 65.8 kg (145 lb)   04/16/12 69.4 kg (153 lb)   11/15/10 69.9 kg (154 lb)     Temp Readings from Last 3 Encounters:   03/10/19 97.9 °F (36.6 °C)   11/19/10 96.7 °F (35.9 °C)   11/12/10 97.3 °F (36.3 °C)     BP Readings from Last 3 Encounters:   03/10/19 93/57   04/16/12 106/73   11/19/10 104/65     Pulse Readings from Last 3 Encounters:   03/10/19 73   04/16/12 63   11/19/10 (!) 55            DATA     LABORATORY DATA:(reviewed/updated 3/10/2019)  No results found for this or any previous visit (from the past 24 hour(s)). No results found for: VALF2, VALAC, VALP, VALPR, DS6, CRBAM, CRBAMP, CARB2, XCRBAM  No results found for: LITHM   RADIOLOGY REPORTS:(reviewed/updated 3/10/2019)  No results found.        MEDICATIONS     ALL MEDICATIONS:   Current Facility-Administered Medications   Medication Dose Route Frequency    [START ON 3/11/2019] haloperidol (HALDOL) tablet 5 mg  5 mg Oral DAILY    haloperidol (HALDOL) tablet 10 mg  10 mg Oral QHS    mirtazapine (REMERON) tablet 15 mg  15 mg Oral QHS    traZODone (DESYREL) tablet 100 mg  100 mg Oral QHS PRN    ziprasidone (GEODON) 20 mg in sterile water (preservative free) 1 mL injection  20 mg IntraMUSCular BID PRN    OLANZapine (ZyPREXA) tablet 5 mg  5 mg Oral Q6H PRN    benztropine (COGENTIN) tablet 2 mg  2 mg Oral BID PRN    benztropine (COGENTIN) injection 2 mg  2 mg IntraMUSCular BID PRN    LORazepam (ATIVAN) injection 2 mg  2 mg IntraMUSCular Q4H PRN    LORazepam (ATIVAN) tablet 1 mg  1 mg Oral Q4H PRN    acetaminophen (TYLENOL) tablet 650 mg  650 mg Oral Q4H PRN    ibuprofen (MOTRIN) tablet 400 mg  400 mg Oral Q8H PRN    magnesium hydroxide (MILK OF MAGNESIA) 400 mg/5 mL oral suspension 30 mL  30 mL Oral DAILY PRN    nicotine (NICODERM CQ) 21 mg/24 hr patch 1 Patch  1 Patch TransDERmal DAILY PRN      SCHEDULED MEDICATIONS:   Current Facility-Administered Medications   Medication Dose Route Frequency    [START ON 3/11/2019] haloperidol (HALDOL) tablet 5 mg  5 mg Oral DAILY    haloperidol (HALDOL) tablet 10 mg  10 mg Oral QHS    mirtazapine (REMERON) tablet 15 mg  15 mg Oral QHS          ASSESSMENT & PLAN     DIAGNOSES REQUIRING ACTIVE TREATMENT AND MONITORING: (reviewed/updated 3/10/2019)  Patient Active Hospital Problem List:   Schizophrenia [facility]) (3/6/2019)    Assessment: patient with ongoing psychosis and disorganization exacerbated by cocaine use. History of stable function on Haldol and Remeron, will restart and plan for [facility]OLFE    Plan:  - CONTINUE Haldol 5 mg Q12H for psychosis, plan to titrate. 3/10-Increased to 5,10. Requests switch to Depot. Confirm dosage of prior depot from [facility]  - CONTINUE Remeron 15 mg for insomnia/depression  - IGM therapy as tolerated  - Expand database / obtain collateral  - Dispo planning  In summary, Jayashree Carranza, is a 53 y.o.  male who presents with a severe exacerbation of the principal diagnosis of Schizophrenia ([facility] [address]    Patient's condition is not improving. Patient requires continued inpatient hospitalization for further stabilization, safety monitoring and medication management. I will continue to coordinate the provision of individual, milieu, occupational, group, and substance abuse therapies to address target symptoms/diagnoses as deemed appropriate for the individual patient.   A coordinated, multidisplinary treatment team round was conducted with the patient (this team consists of the nurse, psychiatric unit pharmcist,  and writer). Complete current electronic health record for patient has been reviewed today including consultant notes, ancillary staff notes, nurses and psychiatric tech notes. Suicide risk assessment completed and patient deemed to be of low risk for suicide at this time. The following regarding medications was addressed during rounds with patient:   the risks and benefits of the proposed medication. The patient was given the opportunity to ask questions. Informed consent given to the use of the above medications. Will continue to adjust psychiatric and non-psychiatric medications (see above \"medication\" section and orders section for details) as deemed appropriate & based upon diagnoses and response to treatment. I will continue to order blood tests/labs and diagnostic tests as deemed appropriate and review results as they become available (see orders for details and above listed lab/test results). I will order psychiatric records from previous [facility] hospitals to further elucidate the nature of patient's psychopathology and review once available. I will gather additional collateral information from friends, family and o/p treatment team to further elucidate the nature of patient's psychopathology and baselline level of psychiatric functioning. I certify that this patient's inpatient psychiatric hospital services furnished since the previous certification were, and continue to be, required for treatment that could reasonably be expected to improve the patient's condition, or for diagnostic study, and that the patient continues to need, on a daily basis, active treatment furnished directly by or requiring the supervision of inpatient psychiatric facility personnel.  In addition, the hospital records show that services furnished were intensive treatment services, admission or related services, or equivalent services.     EXPECTED DISCHARGE DATE/DAY: TBD     DISPOSITION: Home       Signed By:   Janessa Simon MD  3/10/2019

## 2019-03-11 VITALS
HEIGHT: 69 IN | BODY MASS INDEX: 21.48 KG/M2 | WEIGHT: 145 LBS | TEMPERATURE: 97.9 F | HEART RATE: 73 BPM | SYSTOLIC BLOOD PRESSURE: 93 MMHG | OXYGEN SATURATION: 100 % | DIASTOLIC BLOOD PRESSURE: 57 MMHG | RESPIRATION RATE: 17 BRPM

## 2019-03-11 PROCEDURE — 74011250636 HC RX REV CODE- 250/636: Performed by: PSYCHIATRY & NEUROLOGY

## 2019-03-11 PROCEDURE — 74011250637 HC RX REV CODE- 250/637: Performed by: PSYCHIATRY & NEUROLOGY

## 2019-03-11 RX ORDER — HALOPERIDOL DECANOATE 100 MG/ML
100 INJECTION INTRAMUSCULAR
Status: DISCONTINUED | OUTPATIENT
Start: 2019-03-11 | End: 2019-03-11 | Stop reason: HOSPADM

## 2019-03-11 RX ORDER — HALOPERIDOL DECANOATE 100 MG/ML
100 INJECTION INTRAMUSCULAR
Qty: 1 VIAL | Refills: 0 | Status: SHIPPED | OUTPATIENT
Start: 2019-04-08

## 2019-03-11 RX ORDER — HALOPERIDOL 10 MG/1
10 TABLET ORAL
Qty: 14 TAB | Refills: 1 | Status: SHIPPED | OUTPATIENT
Start: 2019-03-11

## 2019-03-11 RX ORDER — MIRTAZAPINE 15 MG/1
15 TABLET, FILM COATED ORAL
Qty: 14 TAB | Refills: 1 | Status: SHIPPED | OUTPATIENT
Start: 2019-03-11

## 2019-03-11 RX ADMIN — HALOPERIDOL DECANOATE 100 MG: 100 INJECTION INTRAMUSCULAR at 13:09

## 2019-03-11 RX ADMIN — HALOPERIDOL 5 MG: 5 TABLET ORAL at 08:18

## 2019-03-11 NOTE — DISCHARGE INSTRUCTIONS
Patient Education        Schizophrenia: Care Instructions  Your Care Instructions  Schizophrenia is a disease that makes it hard to think clearly, manage emotions, and interact with other people. It can cause:  · Delusions. These are beliefs that are not real.  · Hallucinations. These are things that you may see or hear that are not really there. · Paranoia. This is the belief that others are lying, cheating, using you, or trying to harm you. The disease may change your ability to enjoy life, express emotions, or function. At times, you may hear voices, behave strangely, have trouble speaking or understanding speech, or keep to yourself. The goal of treatment is to lower your stress and help your brain function normally. You may need lifelong treatment with medicines and counseling to keep your schizophrenia under control. When schizophrenia is not treated, the risks are higher for suicide, a hospital stay, and other problems. Early treatment called coordinated specialty care [facility]) may help a person who is having his or her first episode of psychotic thoughts. Ask your doctor about [address]. Follow-up care is a key part of your treatment and safety. Be sure to make and go to all appointments, and call your doctor if you are having problems. It's also a good idea to know your test results and keep a list of the medicines you take. How can you care for yourself at home? · Be safe with medicines. Take your medicines exactly as prescribed. Call your doctor if you think you are having a problem with your medicine. When you feel good, you may think that you do not need your medicines. But it is important to keep taking them as scheduled. · Go to your counseling sessions. Call and talk with your counselor if you can't attend or if you don't think the sessions are helping. Do not just stop going. · Eat a healthy diet. Talk with a dietitian about what type of diet may be best for you.   · Do not use alcohol or illegal drugs.  · Keep the numbers for these national suicide hotlines: 5-525-375-TALK (8-229.728.5567) and 8-581-YBNYMXZ (7-442.700.1001). If you or someone you know talks about suicide or feeling hopeless, get help right away. What should you do if someone in your family has schizophrenia? · Learn about the disease and how it may get worse over time. · Remind your family member that you love him or her. · Make a plan with all family members about how to take care of your loved one when his or her symptoms are bad. · Talk about your fears and concerns and those of other family members. Seek counseling if needed. · Do not focus attention only on the person who is in treatment. · Remind yourself that it will take time for changes to occur. · Do not blame yourself for the disease. · Know your legal rights and the legal rights of your family member. · Take care of yourself. Stay involved with your own interests, such as your career, hobbies, and friends. Use exercise, positive self-talk, relaxation, and deep breathing to help lower your stress. · Give yourself time to grieve. You may need to deal with emotions such as anger, fear, and frustration. After you work through your feelings, you will be better able to care for yourself and your family. · If you are having a hard time with your feelings and your interactions with your family member, talk with a counselor. When should you call for help? Call 911 anytime you think you may need emergency care.  For example, call if:    · You are thinking about suicide or are threatening suicide.     · You feel like hurting yourself or someone else.    Call your doctor now or seek immediate medical care if:    · You hear voices.     · You think someone is trying to harm you.     · You cannot concentrate or are easily confused.    Watch closely for changes in your health, and be sure to contact your doctor if:    · You are having trouble taking care of yourself.     · You cannot attend your counseling sessions. Where can you learn more? Go to http://stephen-connor.info/. Enter L220 in the search box to learn more about \"Schizophrenia: Care Instructions. \"  Current as of: September 11, 2018  Content Version: 11.9  © 8573-5947 [employer]. Care instructions adapted under license by [employer] (which disclaims liability or warranty for this information). If you have questions about a medical condition or this instruction, always ask your healthcare professional. [address] any warranty or liability for your use of this information.

## 2019-03-11 NOTE — BH NOTES
Behavioral Health Transition Record to Provider    Patient Name: Ricci Corrales  YOB: 1970  Medical Record Number: [id number]  Date of Admission: 3/6/2019  Date of Discharge: 3/11/2019    Attending Provider: Harris Hwang, *  Discharging Provider: Kaylah Santillan MD  To contact this individual call [phone] and ask the  to page. If unavailable, ask to be transferred to [address] [place] [address] Provider on call. [facility] Provider will be available on call 24/7 and during holidays. Primary Care Provider: Melba Allison MD    No Known Allergies    Reason for Admission:     Admission Diagnosis: [facility]ia ([facility] [F20.9]    * No surgery found *    Results for orders placed or performed during the hospital encounter of 03/06/19   CBC WITH AUTOMATED DIFF   Result Value Ref Range    WBC 5.8 4.1 - 11.1 K/uL    RBC 5.55 4.10 - 5.70 M/uL    HGB 15.5 12.1 - 17.0 g/dL    HCT 47.0 36.6 - 50.3 %    MCV 84.7 80.0 - 99.0 FL    MCH 27.9 26.0 - 34.0 PG    MCHC 33.0 30.0 - 36.5 g/dL    RDW 14.4 11.5 - 14.5 %    PLATELET 607 635 - 712 K/uL    MPV 10.4 8.9 - 12.9 FL    NRBC 0.0 0  WBC    ABSOLUTE NRBC 0.00 0.00 - 0.01 K/uL    NEUTROPHILS 63 32 - 75 %    LYMPHOCYTES 23 12 - 49 %    MONOCYTES 10 5 - 13 %    EOSINOPHILS 3 0 - 7 %    BASOPHILS 1 0 - 1 %    IMMATURE GRANULOCYTES 0 0.0 - 0.5 %    ABS. NEUTROPHILS 3.7 1.8 - 8.0 K/UL    ABS. LYMPHOCYTES 1.3 0.8 - 3.5 K/UL    ABS. MONOCYTES 0.6 0.0 - 1.0 K/UL    ABS. EOSINOPHILS 0.2 0.0 - 0.4 K/UL    ABS. BASOPHILS 0.1 0.0 - 0.1 K/UL    ABS. IMM.  GRANS. 0.0 0.00 - 0.04 K/UL    DF AUTOMATED     METABOLIC PANEL, BASIC   Result Value Ref Range    Sodium 139 136 - 145 mmol/L    Potassium 3.4 (L) 3.5 - 5.1 mmol/L    Chloride 101 97 - 108 mmol/L    CO2 30 21 - 32 mmol/L    Anion gap 8 5 - 15 mmol/L    Glucose 69 65 - 100 mg/dL    BUN 8 6 - 20 MG/DL    Creatinine 0.97 0.70 - 1.30 MG/DL    BUN/Creatinine ratio 8 (L) 12 - 20      GFR est AA >60 >60 ml/min/1.73m2    GFR est non-AA >60 >60 ml/min/1.73m2    Calcium 8.5 8.5 - 10.1 MG/DL   ETHYL ALCOHOL   Result Value Ref Range    ALCOHOL(ETHYL),SERUM <10 <10 MG/DL   URINALYSIS W/ REFLEX CULTURE   Result Value Ref Range    Color DARK YELLOW      Appearance CLEAR CLEAR      Specific gravity 1.020 1.003 - 1.030      pH (UA) 6.0 5.0 - 8.0      Protein TRACE (A) NEG mg/dL    Glucose NEGATIVE  NEG mg/dL    Ketone TRACE (A) NEG mg/dL    Blood NEGATIVE  NEG      Urobilinogen 1.0 0.2 - 1.0 EU/dL    Nitrites NEGATIVE  NEG      Leukocyte Esterase NEGATIVE  NEG      WBC 0-4 0 - 4 /hpf    RBC 0-5 0 - 5 /hpf    Epithelial cells FEW FEW /lpf    Bacteria NEGATIVE  NEG /hpf    UA:UC IF INDICATED CULTURE NOT INDICATED BY UA RESULT CNI     DRUG SCREEN, URINE   Result Value Ref Range    AMPHETAMINES NEGATIVE  NEG      BARBITURATES NEGATIVE  NEG      BENZODIAZEPINES NEGATIVE  NEG      COCAINE POSITIVE (A) NEG      METHADONE NEGATIVE  NEG      OPIATES NEGATIVE  NEG      PCP(PHENCYCLIDINE) NEGATIVE  NEG      THC (TH-CANNABINOL) POSITIVE (A) NEG      Drug screen comment (NOTE)    BILIRUBIN, CONFIRM   Result Value Ref Range    Bilirubin UA, confirm NEGATIVE  NEG     EKG, 12 LEAD, INITIAL   Result Value Ref Range    Ventricular Rate 66 BPM    Atrial Rate 66 BPM    P-R Interval 168 ms    QRS Duration 80 ms    Q-T Interval 418 ms    QTC Calculation (Bezet) 438 ms    Calculated P Axis 78 degrees    Calculated R Axis 79 degrees    Calculated T Axis 74 degrees    Diagnosis       Normal sinus rhythm  Normal ECG  No previous ECGs available  Confirmed by Callie Banks ([id number]) on 3/6/2019 11:10:57 PM         Immunizations administered during this encounter: There is no immunization history on file for this patient.     Screening for Metabolic Disorders for Patients on Antipsychotic Medications  (Data obtained from the EMR)    Estimated Body Mass Index  Estimated body mass index is 21.41 kg/m² as calculated from the following: Height as of this encounter: 5' 9\" (1.753 m). Weight as of this encounter: 65.8 kg (145 lb). Vital Signs/Blood Pressure  Visit Vitals  BP 93/57   Pulse 73   Temp 97.9 °F (36.6 °C)   Resp 17   Ht 5' 9\" (1.753 m)   Wt 65.8 kg (145 lb)   SpO2 100%   BMI 21.41 kg/m²       Blood Glucose/Hemoglobin A1c  Lab Results   Component Value Date/Time    Glucose 69 03/06/2019 02:11 PM       No results found for: HBA1C, HGBE8, KTL8HIAZ     Lipid Panel  No results found for: CHOL, CHOLX, CHLST, CHOLV, 135732, HDL, LDL, LDLC, DLDLP, TGLX, TRIGL, TRIGP, CHHD, CHHDX     Discharge Diagnosis: Please refer to physician's discharge plan. Discharge Plan: Pt was discharged and transported to the [facility]. Pt states that he will be staying with a friend temporarily -off of [employer]. Pt denies SI/HI/AH. Pt's mood is euthymic and affect is mood congruent. Pt's thought process is logical and future oriented. Pt is in agreement with plan to follow up at [facility] tomorrow for case management and medication management. Discharge Medication List and Instructions: There are no discharge medications for this patient. Unresulted Labs (24h ago, onward)    None        To obtain results of studies pending at discharge, please contact N/A    Follow-up Information     Follow up With Specialties Details Why Contact Info    [facility]  On 3/12/2019 Medication and case management appointment for PMHNP Ms. Sweeney Rodriguez and David Adan on March 12th at [address] [place] [address] 140   Address: [address] [facility], [address] [facility]  Phone: [phone]  Fax: [phone]            Advanced Directive:   Does the patient have an appointed surrogate decision maker? Unknown   Does the patient have a Medical Advance Directive? Unknown   Does the patient have a Psychiatric Advance Directive?  Unknown   If the patient does not have a surrogate or Medical Advance Directive AND Psychiatric Advance Directive, the patient was offered information on these advance directives. Unknown        Patient Instructions: Please continue all medications until otherwise directed by physician. Tobacco Cessation Discharge Plan:   Is the patient a smoker and needs referral for smoking cessation? No  Patient referred to the following for smoking cessation with an appointment? No   Patient was offered medication to assist with smoking cessation at discharge? No  Was education for smoking cessation added to the discharge instructions? No     Alcohol/Substance Abuse Discharge Plan:   Does the patient have a history of substance/alcohol abuse and requires a referral for treatment? Yes  Patient referred to the following for substance/alcohol abuse treatment with an appointment? Yes  Patient was offered medication to assist with alcohol cessation at discharge? No  Was education for substance/alcohol abuse added to discharge instructions? Yes     Patient discharged to Home; provided to the patient/caregiver either in hard copy or electronically. Continuing care paperwork was faxed to community mental health providers.

## 2019-03-11 NOTE — BH NOTES
Patient  Being discharged to home. Denies suicidal/homicidal thoughts. No reports of AVH. Patient belongings returned. Discharge summary discussed with patient.  Patient verbalized understanding

## 2019-03-11 NOTE — PROGRESS NOTES
Problem: Psychosis  Goal: *STG: Remains safe in hospital  Outcome: Progressing Towards Goal  Patient remains in room with Q 15 minute checks. Comes out mostly for meals and meds. Continue monitoring as scheduled.

## 2019-03-11 NOTE — DISCHARGE SUMMARY
PSYCHIATRIC DISCHARGE SUMMARY         IDENTIFICATION:    Patient Name  Suzette Dillon   Date of Birth 1970   [facility] [id number]   Medical Record Number  [id number]      Age  53 y.o. PCP Ricky Robles MD   Admit date:  3/6/2019    Discharge date: 3/11/2019   Room Number  309/01  @ [facility]   Date of Service  3/11/2019            TYPE OF DISCHARGE: REGULAR               CONDITION AT DISCHARGE: improved and stable       PROVISIONAL & DISCHARGE DIAGNOSES:    Problem List  Date Reviewed: 4/18/2012          Codes Class    * (Principal) Schizophrenia ([facility] [address] ICD-10-CM: F20.9  ICD-9-CM: 295.90         Abscess of thigh ICD-10-CM: L02.419  ICD-9-CM: 682.6               Active Hospital Problems    *Schizophrenia ([facility] [address]        DISCHARGE DIAGNOSIS:   Axis I:  SEE ABOVE  Axis II: SEE ABOVE  Axis III: SEE ABOVE  Axis IV:  lack of structure  Axis V:  30 on admission, 50 on discharge     CC & HISTORY OF PRESENT ILLNESS:  \"psychosis / SI\"    The Jose G Linares, is a 48 y.o.  BLACK [place]  male with a past psychiatric history significant for schizophrenia, who presents at this time with complaints of (and/or evidence of) the following emotional symptoms: delusions and psychosis.  Additional symptomatology include flight of ideas.  The above symptoms have been present for 2+ weeks. These symptoms are of moderate to high severity. These symptoms are constant in nature.  The patient's condition has been precipitated by psychosocial stressors.  Patient's condition made worse by continued illicit drug use and treatment noncompliance. UDS: +cocaine, THC; BAL=0.      The patient is a poor historian. The patient does not corroborate the above narrative.  The patient contracts for safety on the unit and gives consent for the team to contact collateral. The patient is amenable to initiating treatment while on the unit. Patient seen in his room where he is coherent, but preoccupied with coloring a kimberli shirt but reports taking Haldol and Remeron. Per staff patient has been isolative and irritable, no PRNs given for behavior, got Ambien for sleep.     3/08- patient has been isolative to his room, grossly irritable, psychotic, responding to internal stimuli. He got refused HS Haldol and Remeron but took the medication this morning. Patient seen in his room as he refused treatment team. He declines any specific complaints but refuses interview. 3/09-Pt did come to the team room. Feels Remeron is not helping. Per staff, he is less isolative. Reports, he used be on Haldol depot administered at [facility] and would like to switch to depot prior to discharge. Denies SI.  3/10-Refused scheduled Haldol this morning, Continues to insist that he rather take the Depot injection. Requests dosage increase. Agreed to take the morning missed dosage. Has a court date coming up and needs help to confirm it.        SOCIAL HISTORY:    Social History     Socioeconomic History    Marital status: UNKNOWN     Spouse name: Not on file    Number of children: Not on file    Years of education: Not on file    Highest education level: Not on file   Social Needs    Financial resource strain: Not on file    Food insecurity - worry: Not on file    Food insecurity - inability: Not on file   [employer] needs - medical: Not on file   [employer] needs - non-medical: Not on file   Occupational History    Not on file   Tobacco Use    Smoking status: Current Every Day Smoker     Packs/day: 1.50   Substance and Sexual Activity    Alcohol use: Yes     Comment: Beer    Drug use: No    Sexual activity: Not on file   Other Topics Concern    Caffeine Concern Not Asked    Back Care Not Asked    Exercise Not Asked    Occupational Exposure Not Asked    Sleep Concern Not Asked    Stress Concern Not Asked    Weight Concern Not Asked   Social History Narrative    Not on file      FAMILY HISTORY:   Family History   Problem Relation Age of Onset    Depression Neg Hx     Suicide Neg Hx     Psychotic Disorder Neg Hx     Substance Abuse Neg Hx     Dementia Neg Hx              HOSPITALIZATION COURSE:    Forest Boswell was admitted to the inpatient psychiatric unit [facility] for acute psychiatric stabilization in regards to symptomatology as described in the HPI above. The differential diagnosis at time of admission included: schizophrenia vs schizoaffective vs bipolar. While on the unit Forest Boswell was involved in individual, group, occupational and milieu therapy. Psychiatric medications were adjusted during this hospitalization including Haldol. Forest Boswell demonstrated a slow, but progressive improvement in overall condition. Much of patient's initial presentation appeared to be related to situational stressors, effects of medication non-compliance, drugs of abuse, and psychological factors. Please see individual progress notes for more specific details regarding patient's hospitalization course. Patient with request for discharge today. There are no grounds to seek a TDO. At time of discharge, Forest Boswell is without significant problems of depression, psychosis, or cyndi. Patient free of suicidal and homicidal ideations (appears to be at very low risk of suicide or homicide) and reports many positive predictive factors in terms of not attempting suicide or homicide. Overall presentation at time of discharge is most consistent with the diagnosis of schiziphrenia. Patient has maximized benefit to be derived from acute inpatient psychiatric treatment. All members of the treatment team concur with each other in regards to plans for discharge today. Patient and family are aware and in agreement with discharge and discharge plan.          LABS AND IMAGAING:    Labs Reviewed   METABOLIC PANEL, BASIC - Abnormal; Notable for the following components:       Result Value    Potassium 3.4 (*) BUN/Creatinine ratio 8 (*)     All other components within normal limits   URINALYSIS W/ REFLEX CULTURE - Abnormal; Notable for the following components:    Protein TRACE (*)     Ketone TRACE (*)     All other components within normal limits   DRUG SCREEN, URINE - Abnormal; Notable for the following components:    COCAINE POSITIVE (*)     THC (TH-CANNABINOL) POSITIVE (*)     All other components within normal limits   CBC WITH AUTOMATED DIFF   ETHYL ALCOHOL   BILIRUBIN, CONFIRM   LIPID PANEL   METABOLIC PANEL, COMPREHENSIVE   TSH 3RD GENERATION   HEMOGLOBIN A1C WITH EAG     No results found for: DS35, PHEN, PHENO, PHENT, DILF, DS39, PHENY, PTN, VALF2, VALAC, VALP, VALPR, DS6, CRBAM, CRBAMP, CARB2, XCRBAM  Admission on 03/06/2019, Discharged on 03/11/2019   Component Date Value Ref Range Status    WBC 03/06/2019 5.8  4.1 - 11.1 K/uL Final    RBC 03/06/2019 5.55  4.10 - 5.70 M/uL Final    HGB 03/06/2019 15.5  12.1 - 17.0 g/dL Final    HCT 03/06/2019 47.0  36.6 - 50.3 % Final    MCV 03/06/2019 84.7  80.0 - 99.0 FL Final    MCH 03/06/2019 27.9  26.0 - 34.0 PG Final    MCHC 03/06/2019 33.0  30.0 - 36.5 g/dL Final    RDW 03/06/2019 14.4  11.5 - 14.5 % Final    PLATELET 55/27/3774 613  150 - 400 K/uL Final    MPV 03/06/2019 10.4  8.9 - 12.9 FL Final    NRBC 03/06/2019 0.0  0  WBC Final    ABSOLUTE NRBC 03/06/2019 0.00  0.00 - 0.01 K/uL Final    NEUTROPHILS 03/06/2019 63  32 - 75 % Final    LYMPHOCYTES 03/06/2019 23  12 - 49 % Final    MONOCYTES 03/06/2019 10  5 - 13 % Final    EOSINOPHILS 03/06/2019 3  0 - 7 % Final    BASOPHILS 03/06/2019 1  0 - 1 % Final    IMMATURE GRANULOCYTES 03/06/2019 0  0.0 - 0.5 % Final    ABS. NEUTROPHILS 03/06/2019 3.7  1.8 - 8.0 K/UL Final    ABS. LYMPHOCYTES 03/06/2019 1.3  0.8 - 3.5 K/UL Final    ABS. MONOCYTES 03/06/2019 0.6  0.0 - 1.0 K/UL Final    ABS. EOSINOPHILS 03/06/2019 0.2  0.0 - 0.4 K/UL Final    ABS.  BASOPHILS 03/06/2019 0.1  0.0 - 0.1 K/UL Final    ABS. IMM.  GRANS. 03/06/2019 0.0  0.00 - 0.04 K/UL Final    DF 03/06/2019 AUTOMATED    Final    Sodium 03/06/2019 139  136 - 145 mmol/L Final    Potassium 03/06/2019 3.4* 3.5 - 5.1 mmol/L Final    Chloride 03/06/2019 101  97 - 108 mmol/L Final    CO2 03/06/2019 30  21 - 32 mmol/L Final    Anion gap 03/06/2019 8  5 - 15 mmol/L Final    Glucose 03/06/2019 69  65 - 100 mg/dL Final    BUN 03/06/2019 8  6 - 20 MG/DL Final    Creatinine 03/06/2019 0.97  0.70 - 1.30 MG/DL Final    BUN/Creatinine ratio 03/06/2019 8* 12 - 20   Final    GFR est AA 03/06/2019 >60  >60 ml/min/1.73m2 Final    GFR est non-AA 03/06/2019 >60  >60 ml/min/1.73m2 Final    Calcium 03/06/2019 8.5  8.5 - 10.1 MG/DL Final    ALCOHOL(ETHYL),SERUM 03/06/2019 <10  <10 MG/DL Final    Color 03/06/2019 DARK YELLOW    Final    Appearance 03/06/2019 CLEAR  CLEAR   Final    Specific gravity 03/06/2019 1.020  1.003 - 1.030   Final    pH (UA) 03/06/2019 6.0  5.0 - 8.0   Final    Protein 03/06/2019 TRACE* NEG mg/dL Final    Glucose 03/06/2019 NEGATIVE   NEG mg/dL Final    Ketone 03/06/2019 TRACE* NEG mg/dL Final    Blood 03/06/2019 NEGATIVE   NEG   Final    Urobilinogen 03/06/2019 1.0  0.2 - 1.0 EU/dL Final    Nitrites 03/06/2019 NEGATIVE   NEG   Final    Leukocyte Esterase 03/06/2019 NEGATIVE   NEG   Final    WBC 03/06/2019 0-4  0 - 4 /hpf Final    RBC 03/06/2019 0-5  0 - 5 /hpf Final    Epithelial cells 03/06/2019 FEW  FEW /lpf Final    Bacteria 03/06/2019 NEGATIVE   NEG /hpf Final    UA:UC IF INDICATED 03/06/2019 CULTURE NOT INDICATED BY UA RESULT  CNI   Final    AMPHETAMINES 03/06/2019 NEGATIVE   NEG   Final    BARBITURATES 03/06/2019 NEGATIVE   NEG   Final    BENZODIAZEPINES 03/06/2019 NEGATIVE   NEG   Final    COCAINE 03/06/2019 POSITIVE* NEG   Final    METHADONE 03/06/2019 NEGATIVE   NEG   Final    OPIATES 03/06/2019 NEGATIVE   NEG   Final    PCP(PHENCYCLIDINE) 03/06/2019 NEGATIVE   NEG   Final    THC (TH-CANNABINOL) 03/06/2019 POSITIVE* NEG   Final    Drug screen comment 03/06/2019 (NOTE)   Final    Bilirubin UA, confirm 03/06/2019 NEGATIVE   NEG   Final    Ventricular Rate 03/06/2019 66  BPM Final    Atrial Rate 03/06/2019 66  BPM Final    P-R Interval 03/06/2019 168  ms Final    QRS Duration 03/06/2019 80  ms Final    Q-T Interval 03/06/2019 418  ms Final    QTC Calculation (Bezet) 03/06/2019 438  ms Final    Calculated P Axis 03/06/2019 78  degrees Final    Calculated R Axis 03/06/2019 79  degrees Final    Calculated T Axis 03/06/2019 74  degrees Final    Diagnosis 03/06/2019    Final                    Value:Normal sinus rhythm  Normal ECG  No previous ECGs available  Confirmed by Marina Jones ([id number]) on 3/6/2019 11:10:57 PM       No results found. DISPOSITION:    Home. Patient to f/u with psychiatric and psychotherapy appointments. Patient is to f/u with internist as directed. FOLLOW-UP CARE:    Activity as tolerated  Regular diet  Wound Care: none needed. Follow-up Information     Follow up With Specialties Details Why Contact Irene Chan  On 3/12/2019 Medication and case management appointment with Dr. Jud Esquivel, MARC and Sascha Butler on March 12th at [facility] [address]   Address: [address] [place] [address], [address] [place] [address]  Phone: [phone]  Fax: [phone]      Brianne Pham MD [facility] [phone]                   PROGNOSIS:   Poor ---- based on nature of patient's pathology/ies and treatment compliance issues. Prognosis is greatly dependent upon patient's ability to remain sober and to follow up with scheduled appointments as well as to comply with psychiatric medications as prescribed.             DISCHARGE MEDICATIONS:    Informed consent given for the use of following psychotropic medications:  Discharge Medication List as of 3/11/2019  1:14 PM      START taking these medications Details   haloperidol (HALDOL) 10 mg tablet Take 1 Tab by mouth nightly. , Print, Disp-14 Tab, R-1      haloperidol decanoate (HALDOL DECANOATE) 100 mg/mL injection 1 mL by IntraMUSCular route every twenty-eight (28) days. , Print, Disp-1 Vial, R-0      mirtazapine (REMERON) 15 mg tablet Take 1 Tab by mouth nightly. , Print, Disp-14 Tab, R-1                    A coordinated, multidisplinary treatment team round was conducted with Abhishek Landeroso is done daily here at [facility]. This team consists of the nurse, psychiatric unit pharmacist,  and Portia Pean. I have spent greater than 35 minutes on discharge work.     Signed:  Selena Logan MD  3/11/2019

## 2019-03-11 NOTE — BH NOTES
Patient isolative to room during shift, came out of room for meals and meds. Patient is now getting Haldol 5mg in the AM and 10mg in the PM.  Patient states that the PO Haldol does not work as well as the IM he had in the past.  According to previous note, last dose of IM Haldol was on 8/7/19. BONI @ . [facility] [address] will need to be contacted for further information regarding dosage. Will continue to monitor patient for safety. Patient slept 8 hours.

## 2019-03-11 NOTE — BH NOTES
Behavioral Health Transition Record to Provider    Patient Name: Kelvin Armstrong  YOB: 1970  Medical Record Number: [id number]  Date of Admission: 3/6/2019  Date of Discharge: 3/11/2019    Attending Provider: Grabiel Townsend, *  Discharging Provider: Nargis Denise MD  To contact this individual call [phone] and ask the  to page. If unavailable, ask to be transferred to [facility] Provider on call. [facility] Provider will be available on call 24/7 and during holidays. Primary Care Provider: Solange Lovell MD    No Known Allergies    Reason for Admission: Pt was brought to the ED by EMS - Having auditory hallucinations telling him to \"just do it\" with plan to stab self with a knife. Reports visual hallucinations of mermaid and unicorns. Pt has a history of multiple psych admissions. Pt was a poor historian during this assessment and did not wish to answer many questions. Admission Diagnosis: Schizophrenia ([facility] [address] [F20.9]    * No surgery found *    Results for orders placed or performed during the hospital encounter of 03/06/19   CBC WITH AUTOMATED DIFF   Result Value Ref Range    WBC 5.8 4.1 - 11.1 K/uL    RBC 5.55 4.10 - 5.70 M/uL    HGB 15.5 12.1 - 17.0 g/dL    HCT 47.0 36.6 - 50.3 %    MCV 84.7 80.0 - 99.0 FL    MCH 27.9 26.0 - 34.0 PG    MCHC 33.0 30.0 - 36.5 g/dL    RDW 14.4 11.5 - 14.5 %    PLATELET 635 248 - 074 K/uL    MPV 10.4 8.9 - 12.9 FL    NRBC 0.0 0  WBC    ABSOLUTE NRBC 0.00 0.00 - 0.01 K/uL    NEUTROPHILS 63 32 - 75 %    LYMPHOCYTES 23 12 - 49 %    MONOCYTES 10 5 - 13 %    EOSINOPHILS 3 0 - 7 %    BASOPHILS 1 0 - 1 %    IMMATURE GRANULOCYTES 0 0.0 - 0.5 %    ABS. NEUTROPHILS 3.7 1.8 - 8.0 K/UL    ABS. LYMPHOCYTES 1.3 0.8 - 3.5 K/UL    ABS. MONOCYTES 0.6 0.0 - 1.0 K/UL    ABS. EOSINOPHILS 0.2 0.0 - 0.4 K/UL    ABS. BASOPHILS 0.1 0.0 - 0.1 K/UL    ABS. IMM.  GRANS. 0.0 0.00 - 0.04 K/UL    DF AUTOMATED     METABOLIC PANEL, BASIC Result Value Ref Range    Sodium 139 136 - 145 mmol/L    Potassium 3.4 (L) 3.5 - 5.1 mmol/L    Chloride 101 97 - 108 mmol/L    CO2 30 21 - 32 mmol/L    Anion gap 8 5 - 15 mmol/L    Glucose 69 65 - 100 mg/dL    BUN 8 6 - 20 MG/DL    Creatinine 0.97 0.70 - 1.30 MG/DL    BUN/Creatinine ratio 8 (L) 12 - 20      GFR est AA >60 >60 ml/min/1.73m2    GFR est non-AA >60 >60 ml/min/1.73m2    Calcium 8.5 8.5 - 10.1 MG/DL   ETHYL ALCOHOL   Result Value Ref Range    ALCOHOL(ETHYL),SERUM <10 <10 MG/DL   URINALYSIS W/ REFLEX CULTURE   Result Value Ref Range    Color DARK YELLOW      Appearance CLEAR CLEAR      Specific gravity 1.020 1.003 - 1.030      pH (UA) 6.0 5.0 - 8.0      Protein TRACE (A) NEG mg/dL    Glucose NEGATIVE  NEG mg/dL    Ketone TRACE (A) NEG mg/dL    Blood NEGATIVE  NEG      Urobilinogen 1.0 0.2 - 1.0 EU/dL    Nitrites NEGATIVE  NEG      Leukocyte Esterase NEGATIVE  NEG      WBC 0-4 0 - 4 /hpf    RBC 0-5 0 - 5 /hpf    Epithelial cells FEW FEW /lpf    Bacteria NEGATIVE  NEG /hpf    UA:UC IF INDICATED CULTURE NOT INDICATED BY UA RESULT CNI     DRUG SCREEN, URINE   Result Value Ref Range    AMPHETAMINES NEGATIVE  NEG      BARBITURATES NEGATIVE  NEG      BENZODIAZEPINES NEGATIVE  NEG      COCAINE POSITIVE (A) NEG      METHADONE NEGATIVE  NEG      OPIATES NEGATIVE  NEG      PCP(PHENCYCLIDINE) NEGATIVE  NEG      THC (TH-CANNABINOL) POSITIVE (A) NEG      Drug screen comment (NOTE)    BILIRUBIN, CONFIRM   Result Value Ref Range    Bilirubin UA, confirm NEGATIVE  NEG     EKG, 12 LEAD, INITIAL   Result Value Ref Range    Ventricular Rate 66 BPM    Atrial Rate 66 BPM    P-R Interval 168 ms    QRS Duration 80 ms    Q-T Interval 418 ms    QTC Calculation (Bezet) 438 ms    Calculated P Axis 78 degrees    Calculated R Axis 79 degrees    Calculated T Axis 74 degrees    Diagnosis       Normal sinus rhythm  Normal ECG  No previous ECGs available  Confirmed by Gian Fried ([id number]) on 3/6/2019 11:10:57 PM Immunizations administered during this encounter: There is no immunization history on file for this patient. Screening for Metabolic Disorders for Patients on Antipsychotic Medications  (Data obtained from the EMR)    Estimated Body Mass Index  Estimated body mass index is 21.41 kg/m² as calculated from the following:    Height as of this encounter: 5' 9\" (1.753 m). Weight as of this encounter: 65.8 kg (145 lb). Vital Signs/Blood Pressure  Visit Vitals  BP 93/57   Pulse 73   Temp 97.9 °F (36.6 °C)   Resp 17   Ht 5' 9\" (1.753 m)   Wt 65.8 kg (145 lb)   SpO2 100%   BMI 21.41 kg/m²       Blood Glucose/Hemoglobin A1c  Lab Results   Component Value Date/Time    Glucose 69 03/06/2019 02:11 PM       No results found for: HBA1C, HGBE8, BTU3YPES     Lipid Panel  No results found for: CHOL, CHOLX, CHLST, CHOLV, 667882, HDL, LDL, LDLC, DLDLP, TGLX, TRIGL, TRIGP, CHHD, CHHDX     Discharge Diagnosis: Please refer to physician's discharge summary. Discharge Plan: Pt will be discharging today and transported    Discharge Medication List and Instructions: There are no discharge medications for this patient. Unresulted Labs (24h ago, onward)    None        To obtain results of studies pending at discharge, please contact ***    Follow-up Information    None         Advanced Directive:   Does the patient have an appointed surrogate decision maker? {Yes/No Caps:92378}  Does the patient have a Medical Advance Directive? {YES (DEF)/NO:93993}  Does the patient have a Psychiatric Advance Directive? {YES (DEF)/NO:44920}  If the patient does not have a surrogate or Medical Advance Directive AND Psychiatric Advance Directive, the patient was offered information on these advance directives {Yes/Declined:32473}    Patient Instructions: Please continue all medications until otherwise directed by physician.  ***    Tobacco Cessation Discharge Plan:   Is the patient a smoker and needs referral for smoking cessation? {YES/NO:16506}  Patient referred to the following for smoking cessation with an appointment? {YES/NO:55788}     Patient was offered medication to assist with smoking cessation at discharge? {YES/NO:53228}  Was education for smoking cessation added to the discharge instructions? {YES/NO:59625}    Alcohol/Substance Abuse Discharge Plan:   Does the patient have a history of substance/alcohol abuse and requires a referral for treatment? {YES/NO:11535}  Patient referred to the following for substance/alcohol abuse treatment with an appointment? {YES/NO:24586}  Patient was offered medication to assist with alcohol cessation at discharge? {YES/NO:29301}  Was education for substance/alcohol abuse added to discharge instructions?  {YES/NO:78573}    Patient discharged to [facility] Dischage disposition:23463}

## 2022-03-19 PROBLEM — F20.9 SCHIZOPHRENIA (HCC): Status: ACTIVE | Noted: 2019-03-06

## 2024-04-05 ENCOUNTER — APPOINTMENT (OUTPATIENT)
Facility: HOSPITAL | Age: 54
DRG: 314 | End: 2024-04-05
Payer: MEDICAID

## 2024-04-05 ENCOUNTER — HOSPITAL ENCOUNTER (INPATIENT)
Facility: HOSPITAL | Age: 54
LOS: 11 days | Discharge: NURSING, REHAB OR LONG-TERM CARE | DRG: 314 | End: 2024-04-16
Attending: EMERGENCY MEDICINE | Admitting: INTERNAL MEDICINE
Payer: MEDICAID

## 2024-04-05 DIAGNOSIS — R41.82 ALTERED MENTAL STATUS, UNSPECIFIED ALTERED MENTAL STATUS TYPE: ICD-10-CM

## 2024-04-05 DIAGNOSIS — G62.9 NEUROPATHY: Primary | ICD-10-CM

## 2024-04-05 DIAGNOSIS — S91.101A OPEN WOUND OF RIGHT GREAT TOE, INITIAL ENCOUNTER: ICD-10-CM

## 2024-04-05 DIAGNOSIS — M86.9 RIGHT HALLUX OSTEOMYELITIS (HCC): ICD-10-CM

## 2024-04-05 PROBLEM — G93.40 ENCEPHALOPATHY: Status: ACTIVE | Noted: 2024-04-05

## 2024-04-05 LAB
ALBUMIN SERPL-MCNC: 3.5 G/DL (ref 3.5–5)
ALBUMIN/GLOB SERPL: 0.8 (ref 1.1–2.2)
ALP SERPL-CCNC: 117 U/L (ref 45–117)
ALT SERPL-CCNC: 21 U/L (ref 12–78)
AMPHET UR QL SCN: NEGATIVE
ANION GAP SERPL CALC-SCNC: 2 MMOL/L (ref 5–15)
APPEARANCE UR: CLEAR
AST SERPL-CCNC: 34 U/L (ref 15–37)
BACTERIA URNS QL MICRO: NEGATIVE /HPF
BARBITURATES UR QL SCN: NEGATIVE
BASOPHILS # BLD: 0.1 K/UL (ref 0–0.1)
BASOPHILS NFR BLD: 1 % (ref 0–1)
BENZODIAZ UR QL: NEGATIVE
BILIRUB SERPL-MCNC: 0.5 MG/DL (ref 0.2–1)
BILIRUB UR QL: NEGATIVE
BUN SERPL-MCNC: 3 MG/DL (ref 6–20)
BUN/CREAT SERPL: 3 (ref 12–20)
CALCIUM SERPL-MCNC: 9.4 MG/DL (ref 8.5–10.1)
CANNABINOIDS UR QL SCN: NEGATIVE
CHLORIDE SERPL-SCNC: 107 MMOL/L (ref 97–108)
CK SERPL-CCNC: 242 U/L (ref 39–308)
CO2 SERPL-SCNC: 30 MMOL/L (ref 21–32)
COCAINE UR QL SCN: NEGATIVE
COLOR UR: NORMAL
CREAT SERPL-MCNC: 0.93 MG/DL (ref 0.7–1.3)
DIFFERENTIAL METHOD BLD: ABNORMAL
EOSINOPHIL # BLD: 0.2 K/UL (ref 0–0.4)
EOSINOPHIL NFR BLD: 3 % (ref 0–7)
EPITH CASTS URNS QL MICRO: NORMAL /LPF
ERYTHROCYTE [DISTWIDTH] IN BLOOD BY AUTOMATED COUNT: 21.4 % (ref 11.5–14.5)
ETHANOL SERPL-MCNC: <10 MG/DL (ref 0–0.08)
GLOBULIN SER CALC-MCNC: 4.3 G/DL (ref 2–4)
GLUCOSE SERPL-MCNC: 77 MG/DL (ref 65–100)
GLUCOSE UR STRIP.AUTO-MCNC: NEGATIVE MG/DL
HCT VFR BLD AUTO: 45.2 % (ref 36.6–50.3)
HGB BLD-MCNC: 14.3 G/DL (ref 12.1–17)
HGB UR QL STRIP: NEGATIVE
IMM GRANULOCYTES # BLD AUTO: 0 K/UL (ref 0–0.04)
IMM GRANULOCYTES NFR BLD AUTO: 0 % (ref 0–0.5)
KETONES UR QL STRIP.AUTO: NEGATIVE MG/DL
LACTATE BLD-SCNC: 0.97 MMOL/L (ref 0.4–2)
LEUKOCYTE ESTERASE UR QL STRIP.AUTO: NEGATIVE
LYMPHOCYTES # BLD: 1.9 K/UL (ref 0.8–3.5)
LYMPHOCYTES NFR BLD: 23 % (ref 12–49)
Lab: NORMAL
MCH RBC QN AUTO: 24.3 PG (ref 26–34)
MCHC RBC AUTO-ENTMCNC: 31.6 G/DL (ref 30–36.5)
MCV RBC AUTO: 76.9 FL (ref 80–99)
METHADONE UR QL: NEGATIVE
MONOCYTES # BLD: 0.6 K/UL (ref 0–1)
MONOCYTES NFR BLD: 8 % (ref 5–13)
NEUTS SEG # BLD: 5.3 K/UL (ref 1.8–8)
NEUTS SEG NFR BLD: 65 % (ref 32–75)
NITRITE UR QL STRIP.AUTO: NEGATIVE
NRBC # BLD: 0 K/UL (ref 0–0.01)
NRBC BLD-RTO: 0 PER 100 WBC
OPIATES UR QL: NEGATIVE
PCP UR QL: NEGATIVE
PH UR STRIP: 6 (ref 5–8)
PLATELET # BLD AUTO: 337 K/UL (ref 150–400)
PMV BLD AUTO: 10.4 FL (ref 8.9–12.9)
POTASSIUM SERPL-SCNC: 4 MMOL/L (ref 3.5–5.1)
PROCALCITONIN SERPL-MCNC: <0.05 NG/ML
PROT SERPL-MCNC: 7.8 G/DL (ref 6.4–8.2)
PROT UR STRIP-MCNC: NEGATIVE MG/DL
RBC # BLD AUTO: 5.88 M/UL (ref 4.1–5.7)
RBC #/AREA URNS HPF: NORMAL /HPF (ref 0–5)
RBC MORPH BLD: ABNORMAL
SODIUM SERPL-SCNC: 139 MMOL/L (ref 136–145)
SP GR UR REFRACTOMETRY: <1.005 (ref 1–1.03)
TROPONIN I SERPL HS-MCNC: 12 NG/L (ref 0–76)
URINE CULTURE IF INDICATED: NORMAL
UROBILINOGEN UR QL STRIP.AUTO: 0.2 EU/DL (ref 0.2–1)
WBC # BLD AUTO: 8.1 K/UL (ref 4.1–11.1)
WBC MORPH BLD: ABNORMAL
WBC URNS QL MICRO: NORMAL /HPF (ref 0–4)

## 2024-04-05 PROCEDURE — 80307 DRUG TEST PRSMV CHEM ANLYZR: CPT

## 2024-04-05 PROCEDURE — 96375 TX/PRO/DX INJ NEW DRUG ADDON: CPT

## 2024-04-05 PROCEDURE — 85025 COMPLETE CBC W/AUTO DIFF WBC: CPT

## 2024-04-05 PROCEDURE — 70450 CT HEAD/BRAIN W/O DYE: CPT

## 2024-04-05 PROCEDURE — 99285 EMERGENCY DEPT VISIT HI MDM: CPT

## 2024-04-05 PROCEDURE — 82550 ASSAY OF CK (CPK): CPT

## 2024-04-05 PROCEDURE — 36415 COLL VENOUS BLD VENIPUNCTURE: CPT

## 2024-04-05 PROCEDURE — 84484 ASSAY OF TROPONIN QUANT: CPT

## 2024-04-05 PROCEDURE — 71045 X-RAY EXAM CHEST 1 VIEW: CPT

## 2024-04-05 PROCEDURE — 83605 ASSAY OF LACTIC ACID: CPT

## 2024-04-05 PROCEDURE — 84145 PROCALCITONIN (PCT): CPT

## 2024-04-05 PROCEDURE — 1100000000 HC RM PRIVATE

## 2024-04-05 PROCEDURE — 80053 COMPREHEN METABOLIC PANEL: CPT

## 2024-04-05 PROCEDURE — 87040 BLOOD CULTURE FOR BACTERIA: CPT

## 2024-04-05 PROCEDURE — 81001 URINALYSIS AUTO W/SCOPE: CPT

## 2024-04-05 PROCEDURE — 82077 ASSAY SPEC XCP UR&BREATH IA: CPT

## 2024-04-05 PROCEDURE — 2580000003 HC RX 258: Performed by: EMERGENCY MEDICINE

## 2024-04-05 PROCEDURE — 73630 X-RAY EXAM OF FOOT: CPT

## 2024-04-05 PROCEDURE — 6360000002 HC RX W HCPCS: Performed by: EMERGENCY MEDICINE

## 2024-04-05 PROCEDURE — 96365 THER/PROPH/DIAG IV INF INIT: CPT

## 2024-04-05 PROCEDURE — 96374 THER/PROPH/DIAG INJ IV PUSH: CPT

## 2024-04-05 RX ORDER — POLYETHYLENE GLYCOL 3350 17 G/17G
17 POWDER, FOR SOLUTION ORAL DAILY PRN
Status: DISCONTINUED | OUTPATIENT
Start: 2024-04-05 | End: 2024-04-16 | Stop reason: HOSPADM

## 2024-04-05 RX ORDER — ACETAMINOPHEN 325 MG/1
650 TABLET ORAL EVERY 6 HOURS PRN
Status: DISCONTINUED | OUTPATIENT
Start: 2024-04-05 | End: 2024-04-16 | Stop reason: HOSPADM

## 2024-04-05 RX ORDER — ONDANSETRON 4 MG/1
4 TABLET, ORALLY DISINTEGRATING ORAL EVERY 8 HOURS PRN
Status: DISCONTINUED | OUTPATIENT
Start: 2024-04-05 | End: 2024-04-16 | Stop reason: HOSPADM

## 2024-04-05 RX ORDER — ONDANSETRON 2 MG/ML
4 INJECTION INTRAMUSCULAR; INTRAVENOUS ONCE
Status: COMPLETED | OUTPATIENT
Start: 2024-04-05 | End: 2024-04-05

## 2024-04-05 RX ORDER — POTASSIUM CHLORIDE 750 MG/1
40 TABLET, FILM COATED, EXTENDED RELEASE ORAL PRN
Status: DISCONTINUED | OUTPATIENT
Start: 2024-04-05 | End: 2024-04-09

## 2024-04-05 RX ORDER — SODIUM CHLORIDE 0.9 % (FLUSH) 0.9 %
5-40 SYRINGE (ML) INJECTION PRN
Status: DISCONTINUED | OUTPATIENT
Start: 2024-04-05 | End: 2024-04-16 | Stop reason: HOSPADM

## 2024-04-05 RX ORDER — ENOXAPARIN SODIUM 100 MG/ML
40 INJECTION SUBCUTANEOUS DAILY
Status: DISCONTINUED | OUTPATIENT
Start: 2024-04-06 | End: 2024-04-16 | Stop reason: HOSPADM

## 2024-04-05 RX ORDER — MAGNESIUM SULFATE IN WATER 40 MG/ML
2000 INJECTION, SOLUTION INTRAVENOUS PRN
Status: DISCONTINUED | OUTPATIENT
Start: 2024-04-05 | End: 2024-04-09

## 2024-04-05 RX ORDER — SODIUM CHLORIDE 9 MG/ML
INJECTION, SOLUTION INTRAVENOUS PRN
Status: DISCONTINUED | OUTPATIENT
Start: 2024-04-05 | End: 2024-04-16 | Stop reason: HOSPADM

## 2024-04-05 RX ORDER — NALOXONE HYDROCHLORIDE 1 MG/ML
1 INJECTION INTRAMUSCULAR; INTRAVENOUS; SUBCUTANEOUS
Status: COMPLETED | OUTPATIENT
Start: 2024-04-05 | End: 2024-04-05

## 2024-04-05 RX ORDER — ACETAMINOPHEN 650 MG/1
650 SUPPOSITORY RECTAL EVERY 6 HOURS PRN
Status: DISCONTINUED | OUTPATIENT
Start: 2024-04-05 | End: 2024-04-16 | Stop reason: HOSPADM

## 2024-04-05 RX ORDER — METRONIDAZOLE 500 MG/100ML
500 INJECTION, SOLUTION INTRAVENOUS EVERY 8 HOURS
Status: DISCONTINUED | OUTPATIENT
Start: 2024-04-06 | End: 2024-04-10

## 2024-04-05 RX ORDER — ONDANSETRON 2 MG/ML
4 INJECTION INTRAMUSCULAR; INTRAVENOUS EVERY 6 HOURS PRN
Status: DISCONTINUED | OUTPATIENT
Start: 2024-04-05 | End: 2024-04-16 | Stop reason: HOSPADM

## 2024-04-05 RX ORDER — SODIUM CHLORIDE 0.9 % (FLUSH) 0.9 %
5-40 SYRINGE (ML) INJECTION EVERY 12 HOURS SCHEDULED
Status: DISCONTINUED | OUTPATIENT
Start: 2024-04-05 | End: 2024-04-16 | Stop reason: HOSPADM

## 2024-04-05 RX ORDER — SODIUM CHLORIDE, SODIUM LACTATE, POTASSIUM CHLORIDE, AND CALCIUM CHLORIDE .6; .31; .03; .02 G/100ML; G/100ML; G/100ML; G/100ML
30 INJECTION, SOLUTION INTRAVENOUS ONCE
Status: COMPLETED | OUTPATIENT
Start: 2024-04-05 | End: 2024-04-05

## 2024-04-05 RX ORDER — POTASSIUM CHLORIDE 7.45 MG/ML
10 INJECTION INTRAVENOUS PRN
Status: DISCONTINUED | OUTPATIENT
Start: 2024-04-05 | End: 2024-04-09

## 2024-04-05 RX ADMIN — ONDANSETRON 4 MG: 2 INJECTION INTRAMUSCULAR; INTRAVENOUS at 19:31

## 2024-04-05 RX ADMIN — VANCOMYCIN HYDROCHLORIDE 2000 MG: 10 INJECTION, POWDER, LYOPHILIZED, FOR SOLUTION INTRAVENOUS at 20:51

## 2024-04-05 RX ADMIN — NALOXONE HYDROCHLORIDE 1 MG: 1 INJECTION PARENTERAL at 20:12

## 2024-04-05 RX ADMIN — PIPERACILLIN SODIUM AND TAZOBACTAM SODIUM 4500 MG: 4; .5 INJECTION, POWDER, LYOPHILIZED, FOR SOLUTION INTRAVENOUS at 20:12

## 2024-04-05 RX ADMIN — SODIUM CHLORIDE, POTASSIUM CHLORIDE, SODIUM LACTATE AND CALCIUM CHLORIDE 2313 ML: 600; 310; 30; 20 INJECTION, SOLUTION INTRAVENOUS at 19:32

## 2024-04-05 ASSESSMENT — PAIN - FUNCTIONAL ASSESSMENT: PAIN_FUNCTIONAL_ASSESSMENT: NONE - DENIES PAIN

## 2024-04-05 NOTE — ED PROVIDER NOTES
EMERGENCY DEPARTMENT PHYSICIAN NOTE     Patient: Efraín Ruiz     Time of Service: 4/5/2024  6:48 PM     Chief complaint:   Chief Complaint   Patient presents with    Altered Mental Status        HISTORY:  Patient is a 53 y.o. male who presents to the emergency department with complaints of ams.       Past Medical History:   Diagnosis Date    Schizophrenia (HCC)         History reviewed. No pertinent surgical history.     History reviewed. No pertinent family history.     Social History     Socioeconomic History    Marital status: Unknown     Spouse name: None    Number of children: None    Years of education: None    Highest education level: None     Social Determinants of Health     Food Insecurity: Patient Declined (4/5/2024)    Hunger Vital Sign     Worried About Running Out of Food in the Last Year: Patient declined     Ran Out of Food in the Last Year: Patient declined   Transportation Needs: Patient Declined (4/5/2024)    PRAPARE - Transportation     Lack of Transportation (Medical): Patient declined     Lack of Transportation (Non-Medical): Patient declined   Housing Stability: Patient Declined (4/5/2024)    Housing Stability Vital Sign     Unable to Pay for Housing in the Last Year: Patient declined     Number of Places Lived in the Last Year: 0     Unstable Housing in the Last Year: Patient declined        Current Medications: Reviewed in chart.    Allergies: No Known Allergies       REVIEW OF SYSTEMS: See HPI for pertinent positives and negatives.      PHYSICAL EXAM:  /77   Pulse 86   Temp 98.7 °F (37.1 °C) (Oral)   Resp 12   Ht 1.778 m (5' 10\")   Wt 77.1 kg (170 lb)   SpO2 97%   BMI 24.39 kg/m²    Physical Exam  Vitals and nursing note reviewed.   Constitutional:       General: He is not in acute distress.     Appearance: Normal appearance. He is normal weight. He is not toxic-appearing.   HENT:      Head: Normocephalic and atraumatic.      Nose: Nose normal.      Mouth/Throat:

## 2024-04-05 NOTE — ED TRIAGE NOTES
Patient is a 53 year old male complaining of altered mental status. Per EMS, patient was found in a corner of a rehab facility mumbling and altered. Patient is responsive to verbal stimuli. Lethargic. Patient reports \" he took some pills\".     Patient comes from drug rehab facility he's been at x2 weeks.

## 2024-04-06 LAB
ALBUMIN SERPL-MCNC: 2.8 G/DL (ref 3.5–5)
ALBUMIN/GLOB SERPL: 0.8 (ref 1.1–2.2)
ALP SERPL-CCNC: 99 U/L (ref 45–117)
ALT SERPL-CCNC: 16 U/L (ref 12–78)
ANION GAP SERPL CALC-SCNC: 2 MMOL/L (ref 5–15)
AST SERPL-CCNC: 40 U/L (ref 15–37)
BASOPHILS # BLD: 0.1 K/UL (ref 0–0.1)
BASOPHILS NFR BLD: 1 % (ref 0–1)
BILIRUB DIRECT SERPL-MCNC: 0.1 MG/DL (ref 0–0.2)
BILIRUB SERPL-MCNC: 0.8 MG/DL (ref 0.2–1)
BUN SERPL-MCNC: 3 MG/DL (ref 6–20)
BUN/CREAT SERPL: 3 (ref 12–20)
CALCIUM SERPL-MCNC: 8.2 MG/DL (ref 8.5–10.1)
CHLORIDE SERPL-SCNC: 109 MMOL/L (ref 97–108)
CO2 SERPL-SCNC: 29 MMOL/L (ref 21–32)
CREAT SERPL-MCNC: 0.87 MG/DL (ref 0.7–1.3)
DIFFERENTIAL METHOD BLD: ABNORMAL
EOSINOPHIL # BLD: 0.2 K/UL (ref 0–0.4)
EOSINOPHIL NFR BLD: 3 % (ref 0–7)
ERYTHROCYTE [DISTWIDTH] IN BLOOD BY AUTOMATED COUNT: 21 % (ref 11.5–14.5)
GLOBULIN SER CALC-MCNC: 3.7 G/DL (ref 2–4)
GLUCOSE SERPL-MCNC: 130 MG/DL (ref 65–100)
HCT VFR BLD AUTO: 41.7 % (ref 36.6–50.3)
HGB BLD-MCNC: 13.1 G/DL (ref 12.1–17)
IMM GRANULOCYTES # BLD AUTO: 0 K/UL (ref 0–0.04)
IMM GRANULOCYTES NFR BLD AUTO: 0 % (ref 0–0.5)
INR PPP: 1 (ref 0.9–1.1)
LYMPHOCYTES # BLD: 1.3 K/UL (ref 0.8–3.5)
LYMPHOCYTES NFR BLD: 16 % (ref 12–49)
MAGNESIUM SERPL-MCNC: 1.9 MG/DL (ref 1.6–2.4)
MCH RBC QN AUTO: 24.1 PG (ref 26–34)
MCHC RBC AUTO-ENTMCNC: 31.4 G/DL (ref 30–36.5)
MCV RBC AUTO: 76.8 FL (ref 80–99)
MONOCYTES # BLD: 0.6 K/UL (ref 0–1)
MONOCYTES NFR BLD: 7 % (ref 5–13)
NEUTS SEG # BLD: 5.8 K/UL (ref 1.8–8)
NEUTS SEG NFR BLD: 73 % (ref 32–75)
NRBC # BLD: 0 K/UL (ref 0–0.01)
NRBC BLD-RTO: 0 PER 100 WBC
PHOSPHATE SERPL-MCNC: 3 MG/DL (ref 2.6–4.7)
PLATELET # BLD AUTO: 295 K/UL (ref 150–400)
PMV BLD AUTO: 10 FL (ref 8.9–12.9)
POTASSIUM SERPL-SCNC: 4.4 MMOL/L (ref 3.5–5.1)
PROT SERPL-MCNC: 6.5 G/DL (ref 6.4–8.2)
PROTHROMBIN TIME: 10.9 SEC (ref 9–11.1)
RBC # BLD AUTO: 5.43 M/UL (ref 4.1–5.7)
RBC MORPH BLD: ABNORMAL
SODIUM SERPL-SCNC: 140 MMOL/L (ref 136–145)
VANCOMYCIN SERPL-MCNC: 16.7 UG/ML
WBC # BLD AUTO: 8 K/UL (ref 4.1–11.1)

## 2024-04-06 PROCEDURE — 6360000002 HC RX W HCPCS: Performed by: INTERNAL MEDICINE

## 2024-04-06 PROCEDURE — 36415 COLL VENOUS BLD VENIPUNCTURE: CPT

## 2024-04-06 PROCEDURE — 84100 ASSAY OF PHOSPHORUS: CPT

## 2024-04-06 PROCEDURE — 97530 THERAPEUTIC ACTIVITIES: CPT

## 2024-04-06 PROCEDURE — 2700000000 HC OXYGEN THERAPY PER DAY

## 2024-04-06 PROCEDURE — 87077 CULTURE AEROBIC IDENTIFY: CPT

## 2024-04-06 PROCEDURE — 87186 SC STD MICRODIL/AGAR DIL: CPT

## 2024-04-06 PROCEDURE — 87075 CULTR BACTERIA EXCEPT BLOOD: CPT

## 2024-04-06 PROCEDURE — 83735 ASSAY OF MAGNESIUM: CPT

## 2024-04-06 PROCEDURE — 87147 CULTURE TYPE IMMUNOLOGIC: CPT

## 2024-04-06 PROCEDURE — 1100000000 HC RM PRIVATE

## 2024-04-06 PROCEDURE — 85025 COMPLETE CBC W/AUTO DIFF WBC: CPT

## 2024-04-06 PROCEDURE — 6370000000 HC RX 637 (ALT 250 FOR IP): Performed by: STUDENT IN AN ORGANIZED HEALTH CARE EDUCATION/TRAINING PROGRAM

## 2024-04-06 PROCEDURE — 87205 SMEAR GRAM STAIN: CPT

## 2024-04-06 PROCEDURE — 80202 ASSAY OF VANCOMYCIN: CPT

## 2024-04-06 PROCEDURE — 87070 CULTURE OTHR SPECIMN AEROBIC: CPT

## 2024-04-06 PROCEDURE — 97161 PT EVAL LOW COMPLEX 20 MIN: CPT

## 2024-04-06 PROCEDURE — 80076 HEPATIC FUNCTION PANEL: CPT

## 2024-04-06 PROCEDURE — 80048 BASIC METABOLIC PNL TOTAL CA: CPT

## 2024-04-06 PROCEDURE — 85610 PROTHROMBIN TIME: CPT

## 2024-04-06 PROCEDURE — 2580000003 HC RX 258: Performed by: INTERNAL MEDICINE

## 2024-04-06 PROCEDURE — 80307 DRUG TEST PRSMV CHEM ANLYZR: CPT

## 2024-04-06 RX ORDER — OLANZAPINE 5 MG/1
2.5 TABLET ORAL NIGHTLY
Status: DISCONTINUED | OUTPATIENT
Start: 2024-04-06 | End: 2024-04-08

## 2024-04-06 RX ADMIN — METRONIDAZOLE 500 MG: 500 INJECTION, SOLUTION INTRAVENOUS at 21:02

## 2024-04-06 RX ADMIN — CEFEPIME 2000 MG: 2 INJECTION, POWDER, FOR SOLUTION INTRAVENOUS at 02:09

## 2024-04-06 RX ADMIN — SODIUM CHLORIDE, PRESERVATIVE FREE 10 ML: 5 INJECTION INTRAVENOUS at 21:06

## 2024-04-06 RX ADMIN — VANCOMYCIN HYDROCHLORIDE 1250 MG: 1.25 INJECTION, POWDER, LYOPHILIZED, FOR SOLUTION INTRAVENOUS at 21:04

## 2024-04-06 RX ADMIN — SODIUM CHLORIDE, PRESERVATIVE FREE 10 ML: 5 INJECTION INTRAVENOUS at 08:34

## 2024-04-06 RX ADMIN — CEFEPIME 2000 MG: 2 INJECTION, POWDER, FOR SOLUTION INTRAVENOUS at 10:11

## 2024-04-06 RX ADMIN — OLANZAPINE 2.5 MG: 5 TABLET, FILM COATED ORAL at 21:03

## 2024-04-06 RX ADMIN — METRONIDAZOLE 500 MG: 500 INJECTION, SOLUTION INTRAVENOUS at 04:18

## 2024-04-06 RX ADMIN — ENOXAPARIN SODIUM 40 MG: 100 INJECTION SUBCUTANEOUS at 08:31

## 2024-04-06 RX ADMIN — VANCOMYCIN HYDROCHLORIDE 1250 MG: 1.25 INJECTION, POWDER, LYOPHILIZED, FOR SOLUTION INTRAVENOUS at 08:33

## 2024-04-06 RX ADMIN — METRONIDAZOLE 500 MG: 500 INJECTION, SOLUTION INTRAVENOUS at 11:32

## 2024-04-06 RX ADMIN — CEFEPIME 2000 MG: 2 INJECTION, POWDER, FOR SOLUTION INTRAVENOUS at 18:02

## 2024-04-06 RX ADMIN — SODIUM CHLORIDE: 9 INJECTION, SOLUTION INTRAVENOUS at 11:31

## 2024-04-06 RX ADMIN — SODIUM CHLORIDE: 9 INJECTION, SOLUTION INTRAVENOUS at 18:02

## 2024-04-06 RX ADMIN — SODIUM CHLORIDE: 9 INJECTION, SOLUTION INTRAVENOUS at 10:10

## 2024-04-06 NOTE — H&P
History and Physical    Date of Service:  4/5/2024  Primary Care Provider: Luna Wharton MD  Source of information: Patient, Family, External Medical Records    Chief Complaint: Altered Mental Status      History of Presenting Illness:   Efraín Ruiz is a 53 y.o. male with a past medical history of schizophrenia and drug use who presented to the emergency room due to confusion and unknown intoxication.    Patient was at a drug rehab facility for the past couple of weeks when he was found in the \"corner confused, but stating he had \"taken a pill.\"  He was unable to tell him if it was his pill or somebody else's, or what the pill was.  He was sent in for further evaluation.  He had only endorsed taking 1 pill at that time, and facility does not know of any medications he may have gotten a hold of.  Of note, patient was recently admitted to [facility] due to right foot cellulitis, and had received antibiotics.    In the ER here patient remained confused, mumbled, talks in complete sentences.  At my examination he did not answer any questions.  Event examination revealed a diffusely red foot with draining of the right great toe.  CT demonstrated osteolysis of all toes.  He did not respond to Narcan       REVIEW OF SYSTEMS:  Review of systems not obtained due to patient factors.     No past medical history on file.   No past surgical history on file.  Prior to Admission medications    Not on File     No Known Allergies   No family history on file.   Social History:     Social Determinants of Health     Tobacco Use: Not on file   Alcohol Use: Not on file   Financial Resource Strain: Not on file   Food Insecurity: Not on file   Transportation Needs: Not on file   Physical Activity: Not on file   Stress: Not on file   Social Connections: Not on file   Intimate Partner Violence: Not on file   Depression: Not on file   Housing Stability: Not on file   Interpersonal Safety: Not on file   Utilities: Not on file
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
## 2024-04-07 LAB
ALBUMIN SERPL-MCNC: 2.7 G/DL (ref 3.5–5)
ALBUMIN/GLOB SERPL: 0.8 (ref 1.1–2.2)
ALP SERPL-CCNC: 89 U/L (ref 45–117)
ALT SERPL-CCNC: 12 U/L (ref 12–78)
ANION GAP SERPL CALC-SCNC: 1 MMOL/L (ref 5–15)
AST SERPL-CCNC: 14 U/L (ref 15–37)
BACTERIA SPEC CULT: NORMAL
BACTERIA SPEC CULT: NORMAL
BASOPHILS # BLD: 0.1 K/UL (ref 0–0.1)
BASOPHILS NFR BLD: 1 % (ref 0–1)
BILIRUB DIRECT SERPL-MCNC: 0.1 MG/DL (ref 0–0.2)
BILIRUB SERPL-MCNC: 0.3 MG/DL (ref 0.2–1)
BUN SERPL-MCNC: 8 MG/DL (ref 6–20)
BUN/CREAT SERPL: 9 (ref 12–20)
CALCIUM SERPL-MCNC: 8.5 MG/DL (ref 8.5–10.1)
CHLORIDE SERPL-SCNC: 112 MMOL/L (ref 97–108)
CO2 SERPL-SCNC: 30 MMOL/L (ref 21–32)
CREAT SERPL-MCNC: 0.87 MG/DL (ref 0.7–1.3)
DIFFERENTIAL METHOD BLD: ABNORMAL
EOSINOPHIL # BLD: 0.5 K/UL (ref 0–0.4)
EOSINOPHIL NFR BLD: 7 % (ref 0–7)
ERYTHROCYTE [DISTWIDTH] IN BLOOD BY AUTOMATED COUNT: 20.4 % (ref 11.5–14.5)
FOLATE SERPL-MCNC: 18.5 NG/ML (ref 5–21)
GLOBULIN SER CALC-MCNC: 3.2 G/DL (ref 2–4)
GLUCOSE SERPL-MCNC: 105 MG/DL (ref 65–100)
HCT VFR BLD AUTO: 38.7 % (ref 36.6–50.3)
HGB BLD-MCNC: 12.2 G/DL (ref 12.1–17)
IMM GRANULOCYTES # BLD AUTO: 0 K/UL (ref 0–0.04)
IMM GRANULOCYTES NFR BLD AUTO: 0 % (ref 0–0.5)
INR PPP: 1 (ref 0.9–1.1)
LYMPHOCYTES # BLD: 1.4 K/UL (ref 0.8–3.5)
LYMPHOCYTES NFR BLD: 20 % (ref 12–49)
MAGNESIUM SERPL-MCNC: 2.2 MG/DL (ref 1.6–2.4)
MCH RBC QN AUTO: 24.6 PG (ref 26–34)
MCHC RBC AUTO-ENTMCNC: 31.5 G/DL (ref 30–36.5)
MCV RBC AUTO: 78.2 FL (ref 80–99)
MONOCYTES # BLD: 0.6 K/UL (ref 0–1)
MONOCYTES NFR BLD: 8 % (ref 5–13)
NEUTS SEG # BLD: 4.5 K/UL (ref 1.8–8)
NEUTS SEG NFR BLD: 64 % (ref 32–75)
NRBC # BLD: 0 K/UL (ref 0–0.01)
NRBC BLD-RTO: 0 PER 100 WBC
PHOSPHATE SERPL-MCNC: 2.9 MG/DL (ref 2.6–4.7)
PLATELET # BLD AUTO: 264 K/UL (ref 150–400)
PMV BLD AUTO: 9.8 FL (ref 8.9–12.9)
POTASSIUM SERPL-SCNC: 3.4 MMOL/L (ref 3.5–5.1)
PROT SERPL-MCNC: 5.9 G/DL (ref 6.4–8.2)
PROTHROMBIN TIME: 10.8 SEC (ref 9–11.1)
RBC # BLD AUTO: 4.95 M/UL (ref 4.1–5.7)
RBC MORPH BLD: ABNORMAL
SERVICE CMNT-IMP: NORMAL
SODIUM SERPL-SCNC: 143 MMOL/L (ref 136–145)
TSH SERPL DL<=0.05 MIU/L-ACNC: 0.78 UIU/ML (ref 0.36–3.74)
VIT B12 SERPL-MCNC: 428 PG/ML (ref 193–986)
WBC # BLD AUTO: 7.1 K/UL (ref 4.1–11.1)
WBC MORPH BLD: ABNORMAL

## 2024-04-07 PROCEDURE — 6360000002 HC RX W HCPCS: Performed by: STUDENT IN AN ORGANIZED HEALTH CARE EDUCATION/TRAINING PROGRAM

## 2024-04-07 PROCEDURE — 80076 HEPATIC FUNCTION PANEL: CPT

## 2024-04-07 PROCEDURE — 6370000000 HC RX 637 (ALT 250 FOR IP): Performed by: INTERNAL MEDICINE

## 2024-04-07 PROCEDURE — 2580000003 HC RX 258: Performed by: INTERNAL MEDICINE

## 2024-04-07 PROCEDURE — 85610 PROTHROMBIN TIME: CPT

## 2024-04-07 PROCEDURE — 2580000003 HC RX 258: Performed by: STUDENT IN AN ORGANIZED HEALTH CARE EDUCATION/TRAINING PROGRAM

## 2024-04-07 PROCEDURE — 82746 ASSAY OF FOLIC ACID SERUM: CPT

## 2024-04-07 PROCEDURE — 6360000002 HC RX W HCPCS: Performed by: INTERNAL MEDICINE

## 2024-04-07 PROCEDURE — 1100000000 HC RM PRIVATE

## 2024-04-07 PROCEDURE — 85025 COMPLETE CBC W/AUTO DIFF WBC: CPT

## 2024-04-07 PROCEDURE — 84443 ASSAY THYROID STIM HORMONE: CPT

## 2024-04-07 PROCEDURE — 83735 ASSAY OF MAGNESIUM: CPT

## 2024-04-07 PROCEDURE — 84100 ASSAY OF PHOSPHORUS: CPT

## 2024-04-07 PROCEDURE — 36415 COLL VENOUS BLD VENIPUNCTURE: CPT

## 2024-04-07 PROCEDURE — 6370000000 HC RX 637 (ALT 250 FOR IP): Performed by: STUDENT IN AN ORGANIZED HEALTH CARE EDUCATION/TRAINING PROGRAM

## 2024-04-07 PROCEDURE — 80048 BASIC METABOLIC PNL TOTAL CA: CPT

## 2024-04-07 PROCEDURE — 82607 VITAMIN B-12: CPT

## 2024-04-07 PROCEDURE — 94761 N-INVAS EAR/PLS OXIMETRY MLT: CPT

## 2024-04-07 RX ADMIN — CEFEPIME 2000 MG: 2 INJECTION, POWDER, FOR SOLUTION INTRAVENOUS at 02:30

## 2024-04-07 RX ADMIN — CEFEPIME 2000 MG: 2 INJECTION, POWDER, FOR SOLUTION INTRAVENOUS at 16:59

## 2024-04-07 RX ADMIN — POTASSIUM BICARBONATE 40 MEQ: 782 TABLET, EFFERVESCENT ORAL at 08:00

## 2024-04-07 RX ADMIN — METRONIDAZOLE 500 MG: 500 INJECTION, SOLUTION INTRAVENOUS at 11:36

## 2024-04-07 RX ADMIN — VANCOMYCIN HYDROCHLORIDE 1250 MG: 1.25 INJECTION, POWDER, LYOPHILIZED, FOR SOLUTION INTRAVENOUS at 08:05

## 2024-04-07 RX ADMIN — METRONIDAZOLE 500 MG: 500 INJECTION, SOLUTION INTRAVENOUS at 20:58

## 2024-04-07 RX ADMIN — SODIUM CHLORIDE: 9 INJECTION, SOLUTION INTRAVENOUS at 10:20

## 2024-04-07 RX ADMIN — SODIUM CHLORIDE: 9 INJECTION, SOLUTION INTRAVENOUS at 08:04

## 2024-04-07 RX ADMIN — VANCOMYCIN HYDROCHLORIDE 1250 MG: 1.25 INJECTION, POWDER, LYOPHILIZED, FOR SOLUTION INTRAVENOUS at 20:57

## 2024-04-07 RX ADMIN — ACETAMINOPHEN 650 MG: 325 TABLET ORAL at 04:05

## 2024-04-07 RX ADMIN — ACETAMINOPHEN 650 MG: 325 TABLET ORAL at 18:01

## 2024-04-07 RX ADMIN — METRONIDAZOLE 500 MG: 500 INJECTION, SOLUTION INTRAVENOUS at 04:07

## 2024-04-07 RX ADMIN — CEFEPIME 2000 MG: 2 INJECTION, POWDER, FOR SOLUTION INTRAVENOUS at 10:21

## 2024-04-07 RX ADMIN — OLANZAPINE 2.5 MG: 5 TABLET, FILM COATED ORAL at 20:59

## 2024-04-07 RX ADMIN — SODIUM CHLORIDE, PRESERVATIVE FREE 10 ML: 5 INJECTION INTRAVENOUS at 20:59

## 2024-04-07 RX ADMIN — POTASSIUM CHLORIDE 40 MEQ: 750 TABLET, FILM COATED, EXTENDED RELEASE ORAL at 04:05

## 2024-04-07 ASSESSMENT — PAIN DESCRIPTION - LOCATION
LOCATION: FOOT
LOCATION: FOOT

## 2024-04-07 ASSESSMENT — PAIN DESCRIPTION - ORIENTATION
ORIENTATION: RIGHT
ORIENTATION: RIGHT

## 2024-04-07 ASSESSMENT — PAIN DESCRIPTION - DESCRIPTORS: DESCRIPTORS: ACHING

## 2024-04-07 ASSESSMENT — PAIN - FUNCTIONAL ASSESSMENT: PAIN_FUNCTIONAL_ASSESSMENT: ACTIVITIES ARE NOT PREVENTED

## 2024-04-07 ASSESSMENT — PAIN SCALES - GENERAL
PAINLEVEL_OUTOF10: 0
PAINLEVEL_OUTOF10: 8
PAINLEVEL_OUTOF10: 10

## 2024-04-08 PROBLEM — G62.9 NEUROPATHY: Status: ACTIVE | Noted: 2024-04-08

## 2024-04-08 PROBLEM — S91.101A OPEN WOUND OF RIGHT GREAT TOE: Status: ACTIVE | Noted: 2024-04-08

## 2024-04-08 PROBLEM — M86.9 RIGHT HALLUX OSTEOMYELITIS (HCC): Status: ACTIVE | Noted: 2024-04-08

## 2024-04-08 LAB
ANION GAP SERPL CALC-SCNC: 2 MMOL/L (ref 5–15)
BACTERIA SPEC CULT: NORMAL
BASOPHILS # BLD: 0.1 K/UL (ref 0–0.1)
BASOPHILS NFR BLD: 1 % (ref 0–1)
BUN SERPL-MCNC: 7 MG/DL (ref 6–20)
BUN/CREAT SERPL: 9 (ref 12–20)
CALCIUM SERPL-MCNC: 8.3 MG/DL (ref 8.5–10.1)
CHLORIDE SERPL-SCNC: 112 MMOL/L (ref 97–108)
CO2 SERPL-SCNC: 28 MMOL/L (ref 21–32)
CREAT SERPL-MCNC: 0.8 MG/DL (ref 0.7–1.3)
DIFFERENTIAL METHOD BLD: ABNORMAL
EOSINOPHIL # BLD: 0.5 K/UL (ref 0–0.4)
EOSINOPHIL NFR BLD: 9 % (ref 0–7)
ERYTHROCYTE [DISTWIDTH] IN BLOOD BY AUTOMATED COUNT: 20.6 % (ref 11.5–14.5)
GLUCOSE SERPL-MCNC: 109 MG/DL (ref 65–100)
HCT VFR BLD AUTO: 38 % (ref 36.6–50.3)
HGB BLD-MCNC: 12.2 G/DL (ref 12.1–17)
IMM GRANULOCYTES # BLD AUTO: 0 K/UL (ref 0–0.04)
IMM GRANULOCYTES NFR BLD AUTO: 0 % (ref 0–0.5)
LYMPHOCYTES # BLD: 1.4 K/UL (ref 0.8–3.5)
LYMPHOCYTES NFR BLD: 23 % (ref 12–49)
MCH RBC QN AUTO: 24.8 PG (ref 26–34)
MCHC RBC AUTO-ENTMCNC: 32.1 G/DL (ref 30–36.5)
MCV RBC AUTO: 77.2 FL (ref 80–99)
MONOCYTES # BLD: 0.5 K/UL (ref 0–1)
MONOCYTES NFR BLD: 8 % (ref 5–13)
NEUTS SEG # BLD: 3.5 K/UL (ref 1.8–8)
NEUTS SEG NFR BLD: 59 % (ref 32–75)
NRBC # BLD: 0 K/UL (ref 0–0.01)
NRBC BLD-RTO: 0 PER 100 WBC
PLATELET # BLD AUTO: 272 K/UL (ref 150–400)
PMV BLD AUTO: 9.5 FL (ref 8.9–12.9)
POTASSIUM SERPL-SCNC: 3.8 MMOL/L (ref 3.5–5.1)
RBC # BLD AUTO: 4.92 M/UL (ref 4.1–5.7)
RBC MORPH BLD: ABNORMAL
RBC MORPH BLD: ABNORMAL
SERVICE CMNT-IMP: NORMAL
SODIUM SERPL-SCNC: 142 MMOL/L (ref 136–145)
WBC # BLD AUTO: 6 K/UL (ref 4.1–11.1)
WBC MORPH BLD: ABNORMAL

## 2024-04-08 PROCEDURE — 85025 COMPLETE CBC W/AUTO DIFF WBC: CPT

## 2024-04-08 PROCEDURE — 97116 GAIT TRAINING THERAPY: CPT

## 2024-04-08 PROCEDURE — 36415 COLL VENOUS BLD VENIPUNCTURE: CPT

## 2024-04-08 PROCEDURE — 6370000000 HC RX 637 (ALT 250 FOR IP): Performed by: STUDENT IN AN ORGANIZED HEALTH CARE EDUCATION/TRAINING PROGRAM

## 2024-04-08 PROCEDURE — 6360000002 HC RX W HCPCS: Performed by: STUDENT IN AN ORGANIZED HEALTH CARE EDUCATION/TRAINING PROGRAM

## 2024-04-08 PROCEDURE — 99222 1ST HOSP IP/OBS MODERATE 55: CPT | Performed by: NURSE PRACTITIONER

## 2024-04-08 PROCEDURE — 80048 BASIC METABOLIC PNL TOTAL CA: CPT

## 2024-04-08 PROCEDURE — 6370000000 HC RX 637 (ALT 250 FOR IP): Performed by: INTERNAL MEDICINE

## 2024-04-08 PROCEDURE — 94761 N-INVAS EAR/PLS OXIMETRY MLT: CPT

## 2024-04-08 PROCEDURE — 6360000002 HC RX W HCPCS: Performed by: INTERNAL MEDICINE

## 2024-04-08 PROCEDURE — 1100000000 HC RM PRIVATE

## 2024-04-08 PROCEDURE — 99232 SBSQ HOSP IP/OBS MODERATE 35: CPT | Performed by: PODIATRIST

## 2024-04-08 PROCEDURE — 97530 THERAPEUTIC ACTIVITIES: CPT

## 2024-04-08 PROCEDURE — 2580000003 HC RX 258: Performed by: STUDENT IN AN ORGANIZED HEALTH CARE EDUCATION/TRAINING PROGRAM

## 2024-04-08 PROCEDURE — 2580000003 HC RX 258: Performed by: INTERNAL MEDICINE

## 2024-04-08 RX ORDER — LACTOBACILLUS RHAMNOSUS GG 10B CELL
1 CAPSULE ORAL
Status: DISCONTINUED | OUTPATIENT
Start: 2024-04-09 | End: 2024-04-16 | Stop reason: HOSPADM

## 2024-04-08 RX ORDER — BENZTROPINE MESYLATE 1 MG/1
1 TABLET ORAL 2 TIMES DAILY
Status: DISCONTINUED | OUTPATIENT
Start: 2024-04-08 | End: 2024-04-16 | Stop reason: HOSPADM

## 2024-04-08 RX ORDER — HALOPERIDOL 5 MG/1
5 TABLET ORAL 2 TIMES DAILY
Status: DISCONTINUED | OUTPATIENT
Start: 2024-04-08 | End: 2024-04-16 | Stop reason: HOSPADM

## 2024-04-08 RX ADMIN — ACETAMINOPHEN 650 MG: 325 TABLET ORAL at 04:59

## 2024-04-08 RX ADMIN — HALOPERIDOL 5 MG: 5 TABLET ORAL at 20:44

## 2024-04-08 RX ADMIN — SODIUM CHLORIDE 1250 MG: 9 INJECTION, SOLUTION INTRAVENOUS at 11:10

## 2024-04-08 RX ADMIN — METRONIDAZOLE 500 MG: 500 INJECTION, SOLUTION INTRAVENOUS at 11:07

## 2024-04-08 RX ADMIN — BENZTROPINE MESYLATE 1 MG: 1 TABLET ORAL at 20:44

## 2024-04-08 RX ADMIN — ENOXAPARIN SODIUM 40 MG: 100 INJECTION SUBCUTANEOUS at 09:13

## 2024-04-08 RX ADMIN — SODIUM CHLORIDE 1250 MG: 9 INJECTION, SOLUTION INTRAVENOUS at 23:39

## 2024-04-08 RX ADMIN — WATER 1000 MG: 1 INJECTION INTRAMUSCULAR; INTRAVENOUS; SUBCUTANEOUS at 09:12

## 2024-04-08 RX ADMIN — METRONIDAZOLE 500 MG: 500 INJECTION, SOLUTION INTRAVENOUS at 20:36

## 2024-04-08 RX ADMIN — BENZTROPINE MESYLATE 1 MG: 1 TABLET ORAL at 09:12

## 2024-04-08 RX ADMIN — METRONIDAZOLE 500 MG: 500 INJECTION, SOLUTION INTRAVENOUS at 04:06

## 2024-04-08 RX ADMIN — HALOPERIDOL 5 MG: 5 TABLET ORAL at 09:12

## 2024-04-08 RX ADMIN — CEFEPIME 2000 MG: 2 INJECTION, POWDER, FOR SOLUTION INTRAVENOUS at 02:26

## 2024-04-08 RX ADMIN — ACETAMINOPHEN 650 MG: 325 TABLET ORAL at 20:36

## 2024-04-08 RX ADMIN — SODIUM CHLORIDE, PRESERVATIVE FREE 10 ML: 5 INJECTION INTRAVENOUS at 09:13

## 2024-04-08 RX ADMIN — WATER 1000 MG: 1 INJECTION INTRAMUSCULAR; INTRAVENOUS; SUBCUTANEOUS at 11:10

## 2024-04-08 ASSESSMENT — PAIN SCALES - GENERAL
PAINLEVEL_OUTOF10: 5
PAINLEVEL_OUTOF10: 0
PAINLEVEL_OUTOF10: 0
PAINLEVEL_OUTOF10: 8
PAINLEVEL_OUTOF10: 4
PAINLEVEL_OUTOF10: 0
PAINLEVEL_OUTOF10: 0
PAINLEVEL_OUTOF10: 7
PAINLEVEL_OUTOF10: 0

## 2024-04-08 ASSESSMENT — PAIN DESCRIPTION - ONSET: ONSET: SUDDEN

## 2024-04-08 ASSESSMENT — PAIN DESCRIPTION - LOCATION
LOCATION: TOE (COMMENT WHICH ONE)
LOCATION: ANKLE
LOCATION: TOE (COMMENT WHICH ONE)

## 2024-04-08 ASSESSMENT — PAIN DESCRIPTION - PAIN TYPE: TYPE: ACUTE PAIN;CHRONIC PAIN

## 2024-04-08 ASSESSMENT — PAIN DESCRIPTION - ORIENTATION
ORIENTATION: RIGHT

## 2024-04-08 ASSESSMENT — PAIN - FUNCTIONAL ASSESSMENT: PAIN_FUNCTIONAL_ASSESSMENT: ACTIVITIES ARE NOT PREVENTED

## 2024-04-08 ASSESSMENT — PAIN DESCRIPTION - DESCRIPTORS: DESCRIPTORS: ACHING

## 2024-04-08 NOTE — WOUND CARE
Wound Consult:  new consult Visit. Chart reviewed.  Consulted for right toe wound.  Spoke with patients nurse,  Darrick BLANKENSHIP.  Patient is resting on a hillrom bed with JODY mattress.  Patient is awake, alert, cooperative, moves self  Elia score 19  Patient states he is homeless and toe wound has not been cleaned at all in a while.  Assessment:  POA Right great toe ulcer- 4x3x0.3cm- eschar vs dry exudate on great toe,pulls away with cleansing, moist, pink, red, white at tip, strong odor, purulent drainage, no surrounding pink, painful.     Right plantar great toe-    POA Right 3rd toe- 01x0.3x0.1cm- dry, pink, blanches no drainage.partial thickness  Toenails crusted with dry exudate , difficult to clean. Pulline away from beds.  Treatment:  Right great toe- cleansed with Vashe, ioplex to great toe until Podiatrist sees today  Wound Recommendations:  Will defer to Podiatry for debridement and management of foot  Wound culture already sent on 4/6  Plan:  Spoke with Dr. Clarke concerning Podiatry consult   Please re-consult if any other concerns  Kesha Houston RN  [facility] [place], Wound / Ostomy Department  Wound Healing Office [phone]
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
## 2024-04-09 ENCOUNTER — ANESTHESIA EVENT (OUTPATIENT)
Facility: HOSPITAL | Age: 54
End: 2024-04-09
Payer: MEDICAID

## 2024-04-09 ENCOUNTER — ANESTHESIA (OUTPATIENT)
Facility: HOSPITAL | Age: 54
End: 2024-04-09
Payer: MEDICAID

## 2024-04-09 LAB
ANION GAP SERPL CALC-SCNC: 3 MMOL/L (ref 5–15)
BACTERIA SPEC CULT: ABNORMAL
BASOPHILS # BLD: 0.1 K/UL (ref 0–0.1)
BASOPHILS NFR BLD: 1 % (ref 0–1)
BUN SERPL-MCNC: 9 MG/DL (ref 6–20)
BUN/CREAT SERPL: 9 (ref 12–20)
CALCIUM SERPL-MCNC: 8.8 MG/DL (ref 8.5–10.1)
CHLORIDE SERPL-SCNC: 106 MMOL/L (ref 97–108)
CO2 SERPL-SCNC: 30 MMOL/L (ref 21–32)
CREAT SERPL-MCNC: 0.96 MG/DL (ref 0.7–1.3)
CRP SERPL-MCNC: 0.43 MG/DL (ref 0–0.3)
DIFFERENTIAL METHOD BLD: ABNORMAL
EOSINOPHIL # BLD: 0.6 K/UL (ref 0–0.4)
EOSINOPHIL NFR BLD: 8 % (ref 0–7)
ERYTHROCYTE [DISTWIDTH] IN BLOOD BY AUTOMATED COUNT: 20.7 % (ref 11.5–14.5)
EST. AVERAGE GLUCOSE BLD GHB EST-MCNC: 108 MG/DL
GLUCOSE SERPL-MCNC: 97 MG/DL (ref 65–100)
GRAM STN SPEC: ABNORMAL
HBA1C MFR BLD: 5.4 % (ref 4–5.6)
HCT VFR BLD AUTO: 45.3 % (ref 36.6–50.3)
HGB BLD-MCNC: 14.2 G/DL (ref 12.1–17)
IMM GRANULOCYTES # BLD AUTO: 0 K/UL (ref 0–0.04)
IMM GRANULOCYTES NFR BLD AUTO: 0 % (ref 0–0.5)
LYMPHOCYTES # BLD: 1.5 K/UL (ref 0.8–3.5)
LYMPHOCYTES NFR BLD: 20 % (ref 12–49)
MAGNESIUM SERPL-MCNC: 2.2 MG/DL (ref 1.6–2.4)
MCH RBC QN AUTO: 24.2 PG (ref 26–34)
MCHC RBC AUTO-ENTMCNC: 31.3 G/DL (ref 30–36.5)
MCV RBC AUTO: 77.3 FL (ref 80–99)
MONOCYTES # BLD: 0.5 K/UL (ref 0–1)
MONOCYTES NFR BLD: 7 % (ref 5–13)
NEUTS SEG # BLD: 4.6 K/UL (ref 1.8–8)
NEUTS SEG NFR BLD: 64 % (ref 32–75)
NRBC # BLD: 0 K/UL (ref 0–0.01)
NRBC BLD-RTO: 0 PER 100 WBC
PHOSPHATE SERPL-MCNC: 3.6 MG/DL (ref 2.6–4.7)
PLATELET # BLD AUTO: 321 K/UL (ref 150–400)
PMV BLD AUTO: 9.6 FL (ref 8.9–12.9)
POTASSIUM SERPL-SCNC: 3.8 MMOL/L (ref 3.5–5.1)
RBC # BLD AUTO: 5.86 M/UL (ref 4.1–5.7)
RBC MORPH BLD: ABNORMAL
RBC MORPH BLD: ABNORMAL
SERVICE CMNT-IMP: ABNORMAL
SODIUM SERPL-SCNC: 139 MMOL/L (ref 136–145)
WBC # BLD AUTO: 7.3 K/UL (ref 4.1–11.1)

## 2024-04-09 PROCEDURE — 80048 BASIC METABOLIC PNL TOTAL CA: CPT

## 2024-04-09 PROCEDURE — 2500000003 HC RX 250 WO HCPCS: Performed by: PODIATRIST

## 2024-04-09 PROCEDURE — 97116 GAIT TRAINING THERAPY: CPT

## 2024-04-09 PROCEDURE — 1100000000 HC RM PRIVATE

## 2024-04-09 PROCEDURE — 88311 DECALCIFY TISSUE: CPT

## 2024-04-09 PROCEDURE — 6360000002 HC RX W HCPCS: Performed by: INTERNAL MEDICINE

## 2024-04-09 PROCEDURE — 6360000002 HC RX W HCPCS: Performed by: NURSE ANESTHETIST, CERTIFIED REGISTERED

## 2024-04-09 PROCEDURE — 2500000003 HC RX 250 WO HCPCS: Performed by: NURSE ANESTHETIST, CERTIFIED REGISTERED

## 2024-04-09 PROCEDURE — 7100000001 HC PACU RECOVERY - ADDTL 15 MIN: Performed by: PODIATRIST

## 2024-04-09 PROCEDURE — 6360000002 HC RX W HCPCS: Performed by: STUDENT IN AN ORGANIZED HEALTH CARE EDUCATION/TRAINING PROGRAM

## 2024-04-09 PROCEDURE — 28820 AMPUTATION OF TOE: CPT | Performed by: PODIATRIST

## 2024-04-09 PROCEDURE — 6370000000 HC RX 637 (ALT 250 FOR IP): Performed by: STUDENT IN AN ORGANIZED HEALTH CARE EDUCATION/TRAINING PROGRAM

## 2024-04-09 PROCEDURE — 86140 C-REACTIVE PROTEIN: CPT

## 2024-04-09 PROCEDURE — 2580000003 HC RX 258: Performed by: ANESTHESIOLOGY

## 2024-04-09 PROCEDURE — 3700000001 HC ADD 15 MINUTES (ANESTHESIA): Performed by: PODIATRIST

## 2024-04-09 PROCEDURE — 0Y6P0Z0 DETACHMENT AT RIGHT 1ST TOE, COMPLETE, OPEN APPROACH: ICD-10-PCS | Performed by: PODIATRIST

## 2024-04-09 PROCEDURE — 2580000003 HC RX 258: Performed by: INTERNAL MEDICINE

## 2024-04-09 PROCEDURE — 7100000000 HC PACU RECOVERY - FIRST 15 MIN: Performed by: PODIATRIST

## 2024-04-09 PROCEDURE — 2709999900 HC NON-CHARGEABLE SUPPLY: Performed by: PODIATRIST

## 2024-04-09 PROCEDURE — 99232 SBSQ HOSP IP/OBS MODERATE 35: CPT | Performed by: PODIATRIST

## 2024-04-09 PROCEDURE — 3700000000 HC ANESTHESIA ATTENDED CARE: Performed by: PODIATRIST

## 2024-04-09 PROCEDURE — 36415 COLL VENOUS BLD VENIPUNCTURE: CPT

## 2024-04-09 PROCEDURE — 84100 ASSAY OF PHOSPHORUS: CPT

## 2024-04-09 PROCEDURE — 3600000002 HC SURGERY LEVEL 2 BASE: Performed by: PODIATRIST

## 2024-04-09 PROCEDURE — 3600000012 HC SURGERY LEVEL 2 ADDTL 15MIN: Performed by: PODIATRIST

## 2024-04-09 PROCEDURE — 94761 N-INVAS EAR/PLS OXIMETRY MLT: CPT

## 2024-04-09 PROCEDURE — 83036 HEMOGLOBIN GLYCOSYLATED A1C: CPT

## 2024-04-09 PROCEDURE — 85025 COMPLETE CBC W/AUTO DIFF WBC: CPT

## 2024-04-09 PROCEDURE — 2580000003 HC RX 258: Performed by: STUDENT IN AN ORGANIZED HEALTH CARE EDUCATION/TRAINING PROGRAM

## 2024-04-09 PROCEDURE — 6370000000 HC RX 637 (ALT 250 FOR IP): Performed by: INTERNAL MEDICINE

## 2024-04-09 PROCEDURE — 88305 TISSUE EXAM BY PATHOLOGIST: CPT

## 2024-04-09 PROCEDURE — 2580000003 HC RX 258: Performed by: NURSE ANESTHETIST, CERTIFIED REGISTERED

## 2024-04-09 PROCEDURE — 97530 THERAPEUTIC ACTIVITIES: CPT

## 2024-04-09 PROCEDURE — 83735 ASSAY OF MAGNESIUM: CPT

## 2024-04-09 RX ORDER — ONDANSETRON 2 MG/ML
4 INJECTION INTRAMUSCULAR; INTRAVENOUS
Status: DISCONTINUED | OUTPATIENT
Start: 2024-04-09 | End: 2024-04-10 | Stop reason: HOSPADM

## 2024-04-09 RX ORDER — SODIUM CHLORIDE, SODIUM LACTATE, POTASSIUM CHLORIDE, CALCIUM CHLORIDE 600; 310; 30; 20 MG/100ML; MG/100ML; MG/100ML; MG/100ML
INJECTION, SOLUTION INTRAVENOUS CONTINUOUS
Status: DISCONTINUED | OUTPATIENT
Start: 2024-04-09 | End: 2024-04-11

## 2024-04-09 RX ORDER — NALOXONE HYDROCHLORIDE 0.4 MG/ML
INJECTION, SOLUTION INTRAMUSCULAR; INTRAVENOUS; SUBCUTANEOUS PRN
Status: DISCONTINUED | OUTPATIENT
Start: 2024-04-09 | End: 2024-04-10 | Stop reason: HOSPADM

## 2024-04-09 RX ORDER — FENTANYL CITRATE 50 UG/ML
INJECTION, SOLUTION INTRAMUSCULAR; INTRAVENOUS PRN
Status: DISCONTINUED | OUTPATIENT
Start: 2024-04-09 | End: 2024-04-09 | Stop reason: SDUPTHER

## 2024-04-09 RX ORDER — SODIUM CHLORIDE, SODIUM LACTATE, POTASSIUM CHLORIDE, CALCIUM CHLORIDE 600; 310; 30; 20 MG/100ML; MG/100ML; MG/100ML; MG/100ML
INJECTION, SOLUTION INTRAVENOUS CONTINUOUS
Status: DISCONTINUED | OUTPATIENT
Start: 2024-04-10 | End: 2024-04-10 | Stop reason: HOSPADM

## 2024-04-09 RX ORDER — FENTANYL CITRATE 50 UG/ML
100 INJECTION, SOLUTION INTRAMUSCULAR; INTRAVENOUS
Status: DISCONTINUED | OUTPATIENT
Start: 2024-04-09 | End: 2024-04-09 | Stop reason: HOSPADM

## 2024-04-09 RX ORDER — LIDOCAINE HYDROCHLORIDE 10 MG/ML
INJECTION, SOLUTION EPIDURAL; INFILTRATION; INTRACAUDAL; PERINEURAL PRN
Status: DISCONTINUED | OUTPATIENT
Start: 2024-04-09 | End: 2024-04-09 | Stop reason: HOSPADM

## 2024-04-09 RX ORDER — HALOPERIDOL 5 MG/ML
2 INJECTION INTRAMUSCULAR EVERY 6 HOURS PRN
Status: DISCONTINUED | OUTPATIENT
Start: 2024-04-09 | End: 2024-04-16 | Stop reason: HOSPADM

## 2024-04-09 RX ORDER — MIDAZOLAM HYDROCHLORIDE 1 MG/ML
INJECTION INTRAMUSCULAR; INTRAVENOUS PRN
Status: DISCONTINUED | OUTPATIENT
Start: 2024-04-09 | End: 2024-04-09 | Stop reason: SDUPTHER

## 2024-04-09 RX ORDER — OXYCODONE HYDROCHLORIDE 5 MG/1
5 TABLET ORAL EVERY 4 HOURS PRN
Status: DISCONTINUED | OUTPATIENT
Start: 2024-04-09 | End: 2024-04-16 | Stop reason: HOSPADM

## 2024-04-09 RX ORDER — DIPHENHYDRAMINE HYDROCHLORIDE 50 MG/ML
12.5 INJECTION INTRAMUSCULAR; INTRAVENOUS
Status: DISCONTINUED | OUTPATIENT
Start: 2024-04-09 | End: 2024-04-10 | Stop reason: HOSPADM

## 2024-04-09 RX ORDER — SODIUM CHLORIDE, SODIUM LACTATE, POTASSIUM CHLORIDE, CALCIUM CHLORIDE 600; 310; 30; 20 MG/100ML; MG/100ML; MG/100ML; MG/100ML
INJECTION, SOLUTION INTRAVENOUS CONTINUOUS PRN
Status: DISCONTINUED | OUTPATIENT
Start: 2024-04-09 | End: 2024-04-09 | Stop reason: SDUPTHER

## 2024-04-09 RX ORDER — MIDAZOLAM HYDROCHLORIDE 2 MG/2ML
2 INJECTION, SOLUTION INTRAMUSCULAR; INTRAVENOUS
Status: DISCONTINUED | OUTPATIENT
Start: 2024-04-09 | End: 2024-04-09 | Stop reason: HOSPADM

## 2024-04-09 RX ORDER — DEXMEDETOMIDINE HYDROCHLORIDE 100 UG/ML
INJECTION, SOLUTION INTRAVENOUS PRN
Status: DISCONTINUED | OUTPATIENT
Start: 2024-04-09 | End: 2024-04-09 | Stop reason: SDUPTHER

## 2024-04-09 RX ORDER — PROPOFOL 10 MG/ML
INJECTION, EMULSION INTRAVENOUS CONTINUOUS PRN
Status: DISCONTINUED | OUTPATIENT
Start: 2024-04-09 | End: 2024-04-09 | Stop reason: SDUPTHER

## 2024-04-09 RX ORDER — SODIUM CHLORIDE, SODIUM LACTATE, POTASSIUM CHLORIDE, CALCIUM CHLORIDE 600; 310; 30; 20 MG/100ML; MG/100ML; MG/100ML; MG/100ML
INJECTION, SOLUTION INTRAVENOUS CONTINUOUS
Status: DISCONTINUED | OUTPATIENT
Start: 2024-04-09 | End: 2024-04-09 | Stop reason: HOSPADM

## 2024-04-09 RX ORDER — LIDOCAINE HYDROCHLORIDE 10 MG/ML
1 INJECTION, SOLUTION EPIDURAL; INFILTRATION; INTRACAUDAL; PERINEURAL
Status: DISCONTINUED | OUTPATIENT
Start: 2024-04-09 | End: 2024-04-09 | Stop reason: HOSPADM

## 2024-04-09 RX ADMIN — MIDAZOLAM HYDROCHLORIDE 2 MG: 1 INJECTION, SOLUTION INTRAMUSCULAR; INTRAVENOUS at 23:02

## 2024-04-09 RX ADMIN — HALOPERIDOL 5 MG: 5 TABLET ORAL at 09:12

## 2024-04-09 RX ADMIN — SODIUM CHLORIDE 1250 MG: 9 INJECTION, SOLUTION INTRAVENOUS at 22:50

## 2024-04-09 RX ADMIN — DEXMEDETOMIDINE 8 MCG: 100 INJECTION, SOLUTION INTRAVENOUS at 23:02

## 2024-04-09 RX ADMIN — SODIUM CHLORIDE, POTASSIUM CHLORIDE, SODIUM LACTATE AND CALCIUM CHLORIDE: 600; 310; 30; 20 INJECTION, SOLUTION INTRAVENOUS at 22:02

## 2024-04-09 RX ADMIN — SODIUM CHLORIDE, PRESERVATIVE FREE 5 ML: 5 INJECTION INTRAVENOUS at 20:52

## 2024-04-09 RX ADMIN — METRONIDAZOLE 500 MG: 500 INJECTION, SOLUTION INTRAVENOUS at 04:18

## 2024-04-09 RX ADMIN — FENTANYL CITRATE 50 MCG: 50 INJECTION, SOLUTION INTRAMUSCULAR; INTRAVENOUS at 23:05

## 2024-04-09 RX ADMIN — PROPOFOL 150 MCG/KG/MIN: 10 INJECTION, EMULSION INTRAVENOUS at 23:05

## 2024-04-09 RX ADMIN — WATER 2000 MG: 1 INJECTION INTRAMUSCULAR; INTRAVENOUS; SUBCUTANEOUS at 09:11

## 2024-04-09 RX ADMIN — METRONIDAZOLE 500 MG: 500 INJECTION, SOLUTION INTRAVENOUS at 20:51

## 2024-04-09 RX ADMIN — METRONIDAZOLE 500 MG: 500 INJECTION, SOLUTION INTRAVENOUS at 14:23

## 2024-04-09 RX ADMIN — FENTANYL CITRATE 50 MCG: 50 INJECTION, SOLUTION INTRAMUSCULAR; INTRAVENOUS at 22:27

## 2024-04-09 RX ADMIN — SODIUM CHLORIDE, POTASSIUM CHLORIDE, SODIUM LACTATE AND CALCIUM CHLORIDE: 600; 310; 30; 20 INJECTION, SOLUTION INTRAVENOUS at 22:09

## 2024-04-09 RX ADMIN — BENZTROPINE MESYLATE 1 MG: 1 TABLET ORAL at 09:12

## 2024-04-09 RX ADMIN — OXYCODONE 5 MG: 5 TABLET ORAL at 12:42

## 2024-04-09 RX ADMIN — SODIUM CHLORIDE 1250 MG: 9 INJECTION, SOLUTION INTRAVENOUS at 11:00

## 2024-04-09 ASSESSMENT — PAIN SCALES - GENERAL
PAINLEVEL_OUTOF10: 7
PAINLEVEL_OUTOF10: 4
PAINLEVEL_OUTOF10: 7
PAINLEVEL_OUTOF10: 0
PAINLEVEL_OUTOF10: 4

## 2024-04-09 ASSESSMENT — PAIN DESCRIPTION - ORIENTATION: ORIENTATION: RIGHT

## 2024-04-09 ASSESSMENT — PAIN - FUNCTIONAL ASSESSMENT: PAIN_FUNCTIONAL_ASSESSMENT: 0-10

## 2024-04-09 ASSESSMENT — PAIN DESCRIPTION - LOCATION: LOCATION: TOE (COMMENT WHICH ONE)

## 2024-04-10 PROBLEM — Z59.01 SHELTERED HOMELESSNESS: Status: ACTIVE | Noted: 2024-04-10

## 2024-04-10 PROBLEM — F19.90 DRUG USE: Status: ACTIVE | Noted: 2024-04-10

## 2024-04-10 LAB — CREAT SERPL-MCNC: 0.79 MG/DL (ref 0.7–1.3)

## 2024-04-10 PROCEDURE — 6370000000 HC RX 637 (ALT 250 FOR IP): Performed by: STUDENT IN AN ORGANIZED HEALTH CARE EDUCATION/TRAINING PROGRAM

## 2024-04-10 PROCEDURE — 99232 SBSQ HOSP IP/OBS MODERATE 35: CPT | Performed by: PODIATRIST

## 2024-04-10 PROCEDURE — 94761 N-INVAS EAR/PLS OXIMETRY MLT: CPT

## 2024-04-10 PROCEDURE — 2580000003 HC RX 258: Performed by: INTERNAL MEDICINE

## 2024-04-10 PROCEDURE — 36415 COLL VENOUS BLD VENIPUNCTURE: CPT

## 2024-04-10 PROCEDURE — 97164 PT RE-EVAL EST PLAN CARE: CPT

## 2024-04-10 PROCEDURE — 97116 GAIT TRAINING THERAPY: CPT

## 2024-04-10 PROCEDURE — 6370000000 HC RX 637 (ALT 250 FOR IP): Performed by: INTERNAL MEDICINE

## 2024-04-10 PROCEDURE — 6360000002 HC RX W HCPCS: Performed by: INTERNAL MEDICINE

## 2024-04-10 PROCEDURE — 99223 1ST HOSP IP/OBS HIGH 75: CPT | Performed by: INTERNAL MEDICINE

## 2024-04-10 PROCEDURE — 2580000003 HC RX 258: Performed by: STUDENT IN AN ORGANIZED HEALTH CARE EDUCATION/TRAINING PROGRAM

## 2024-04-10 PROCEDURE — 82565 ASSAY OF CREATININE: CPT

## 2024-04-10 PROCEDURE — 1100000000 HC RM PRIVATE

## 2024-04-10 PROCEDURE — 6360000002 HC RX W HCPCS: Performed by: STUDENT IN AN ORGANIZED HEALTH CARE EDUCATION/TRAINING PROGRAM

## 2024-04-10 RX ORDER — METRONIDAZOLE 250 MG/1
500 TABLET ORAL EVERY 8 HOURS SCHEDULED
Status: DISCONTINUED | OUTPATIENT
Start: 2024-04-10 | End: 2024-04-12

## 2024-04-10 RX ADMIN — METRONIDAZOLE 500 MG: 500 TABLET ORAL at 12:20

## 2024-04-10 RX ADMIN — SODIUM CHLORIDE, PRESERVATIVE FREE 10 ML: 5 INJECTION INTRAVENOUS at 20:10

## 2024-04-10 RX ADMIN — METRONIDAZOLE 500 MG: 500 TABLET ORAL at 21:18

## 2024-04-10 RX ADMIN — ENOXAPARIN SODIUM 40 MG: 100 INJECTION SUBCUTANEOUS at 09:31

## 2024-04-10 RX ADMIN — HYDROMORPHONE HYDROCHLORIDE 0.5 MG: 1 INJECTION, SOLUTION INTRAMUSCULAR; INTRAVENOUS; SUBCUTANEOUS at 03:36

## 2024-04-10 RX ADMIN — SODIUM CHLORIDE 1250 MG: 9 INJECTION, SOLUTION INTRAVENOUS at 09:32

## 2024-04-10 RX ADMIN — HALOPERIDOL 5 MG: 5 TABLET ORAL at 20:08

## 2024-04-10 RX ADMIN — SODIUM CHLORIDE 1250 MG: 9 INJECTION, SOLUTION INTRAVENOUS at 21:22

## 2024-04-10 RX ADMIN — Medication 1 CAPSULE: at 09:30

## 2024-04-10 RX ADMIN — WATER 2000 MG: 1 INJECTION INTRAMUSCULAR; INTRAVENOUS; SUBCUTANEOUS at 09:30

## 2024-04-10 RX ADMIN — SODIUM CHLORIDE, POTASSIUM CHLORIDE, SODIUM LACTATE AND CALCIUM CHLORIDE: 600; 310; 30; 20 INJECTION, SOLUTION INTRAVENOUS at 00:46

## 2024-04-10 RX ADMIN — METRONIDAZOLE 500 MG: 500 INJECTION, SOLUTION INTRAVENOUS at 03:39

## 2024-04-10 RX ADMIN — BENZTROPINE MESYLATE 1 MG: 1 TABLET ORAL at 20:08

## 2024-04-10 RX ADMIN — HYDROMORPHONE HYDROCHLORIDE 0.5 MG: 1 INJECTION, SOLUTION INTRAMUSCULAR; INTRAVENOUS; SUBCUTANEOUS at 21:18

## 2024-04-10 RX ADMIN — BENZTROPINE MESYLATE 1 MG: 1 TABLET ORAL at 09:30

## 2024-04-10 RX ADMIN — HYDROMORPHONE HYDROCHLORIDE 0.5 MG: 1 INJECTION, SOLUTION INTRAMUSCULAR; INTRAVENOUS; SUBCUTANEOUS at 07:52

## 2024-04-10 RX ADMIN — HALOPERIDOL 5 MG: 5 TABLET ORAL at 09:30

## 2024-04-10 ASSESSMENT — PAIN DESCRIPTION - DESCRIPTORS
DESCRIPTORS: ACHING
DESCRIPTORS: ACHING
DESCRIPTORS: ACHING;SHARP

## 2024-04-10 ASSESSMENT — PAIN DESCRIPTION - LOCATION
LOCATION: FOOT

## 2024-04-10 ASSESSMENT — PAIN SCALES - GENERAL
PAINLEVEL_OUTOF10: 8
PAINLEVEL_OUTOF10: 3
PAINLEVEL_OUTOF10: 10
PAINLEVEL_OUTOF10: 4
PAINLEVEL_OUTOF10: 5
PAINLEVEL_OUTOF10: 0
PAINLEVEL_OUTOF10: 9

## 2024-04-10 ASSESSMENT — PAIN DESCRIPTION - ORIENTATION
ORIENTATION: RIGHT

## 2024-04-10 NOTE — OP NOTE
Operative Note      Patient: Efraín Ruiz  YOB: 1970  MRN: [id number]    Date of Procedure: 4/9/2024    Pre-Op Diagnosis Codes:     * Osteomyelitis, unspecified site, unspecified type ([facility]) [M86.9]    Post-Op Diagnosis: Same       Procedure(s):  RIGHT HALLUX  AMPUTATION    Surgeon(s):  Ye Daniel DPM    Assistant:   Surgical Assistant: Robbi Marcelo    Anesthesia: General    Estimated Blood Loss (mL): Minimal    Complications: None    Specimens:   * No specimens in log *    Implants:  * No implants in log *      Drains: * No LDAs found *    Findings:  Infection Present At Time Of Surgery (PATOS) (choose all levels that have infection present):  - Superficial Infection (skin/subcutaneous) present as evidenced by fluid consistent with infection  Other Findings: As expected    Detailed Description of Procedure:   Patient was seen in the pre-operative holding area and all questions were answered and all concerns were addressed. The operative procedure was discussed in great detail, with all possible complications highlighted.  Patient verbalized complete understanding and the consent was signed and witnessed. The operative limb was marked and the pt was transported to the operating room. The patient was transferred to the operating table and anesthesia was administered as indicated above. The lower extremity was scrubbed and draped in sterile fashion. A time out was performed to confirm the correct patient, correct procedure, correct limb, abx, allergies, fire risk and attendees within the operating room. Upon completion, procedure #1 commenced.     Attention was directed to patient's right hallux where ulcer was noted with bone exposure.  Using a 15 blade an incision was made at the level of the first metatarsal phalangeal joint in which the right hallux was disarticulated and sent to pathology for analysis.  The wounds then copiously irrigated with normal saline all bleeding vessels were

## 2024-04-10 NOTE — ANESTHESIA PRE PROCEDURE
\"[id number]\", \"[id number]\", \"BEART\", \"[id number]\"     Type & Screen (If Applicable):  No results found for: \"LABABO\", \"LABRH\"    Drug/Infectious Status (If Applicable):  No results found for: \"HIV\", \"HEPCAB\"    COVID-19 Screening (If Applicable): No results found for: \"COVID19\"        Anesthesia Evaluation  Patient summary reviewed and Nursing notes reviewed  Airway: Mallampati: I  TM distance: >3 FB   Neck ROM: full  Mouth opening: > = 3 FB   Dental:          Pulmonary:normal exam                               Cardiovascular:  Exercise tolerance: good (>4 METS)                    Neuro/Psych:   (+) psychiatric history:            GI/Hepatic/Renal: Neg GI/Hepatic/Renal ROS            Endo/Other: Negative Endo/Other ROS                    Abdominal:             Vascular:          Other Findings:       Anesthesia Plan      MAC     ASA 2 - emergent             Anesthetic plan and risks discussed with patient (MAC as tolerated -GA/TIVA/GETA as needed d/w pt - WTP with same).      Plan discussed with CRNA.                Michael Woodson MD   4/10/2024

## 2024-04-10 NOTE — ANESTHESIA POSTPROCEDURE EVALUATION
Department of Anesthesiology  Postprocedure Note    Patient: Efraín Ruiz  MRN: [id number]  YOB: 1970  Date of evaluation: 4/10/2024    Procedure Summary       Date: 04/09/24 Room / Location: [facility] MAIN OR F5 / [facility] MAIN OR    Anesthesia Start: 2302 Anesthesia Stop: 2341    Procedure: RIGHT HALLUX  AMPUTATION (Right: Foot) Diagnosis:       Osteomyelitis, unspecified site, unspecified type (HCC)      (Osteomyelitis, unspecified site, unspecified type (HCC) [M86.9])    Surgeons: Ye Daniel DPM Responsible Provider: Michael Woodson MD    Anesthesia Type: MAC ASA Status: Not recorded            Anesthesia Type: MAC    Umair Phase I: Umair Score: 6    Umair Phase II:      Anesthesia Post Evaluation    Patient location during evaluation: PACU  Patient participation: complete - patient participated  Level of consciousness: awake  Airway patency: patent  Nausea & Vomiting: no vomiting and no nausea  Cardiovascular status: hemodynamically stable  Respiratory status: acceptable  Hydration status: stable  Pain management: adequate    No notable events documented.
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
## 2024-04-10 NOTE — PERIOP NOTE
TRANSFER - OUT REPORT:    Verbal report given to Tariq RN on Efraín CHAU [place]  being transferred to [facility] for routine post-op       Report consisted of patient's Situation, Background, Assessment and   Recommendations(SBAR).     Information from the following report(s) Nurse Handoff Report, Surgery Report, Intake/Output, MAR, Recent Results, and Cardiac Rhythm NSR  was reviewed with the receiving nurse.           Lines:   Peripheral IV 04/06/24 Left;Anterior Forearm (Active)   Site Assessment Clean, dry & intact 04/09/24 2336   Line Status Infusing 04/09/24 2336   Line Care Ports disinfected 04/09/24 2002   Phlebitis Assessment No symptoms 04/09/24 2336   Infiltration Assessment 0 04/09/24 2336   Alcohol Cap Used Yes 04/09/24 2336   Dressing Status Clean, dry & intact 04/09/24 2336   Dressing Type Transparent 04/09/24 2336   Dressing Intervention New 04/06/24 1625        Opportunity for questions and clarification was provided.      Patient transported with:  O2 @ 2lpm and Registered Nurse         Yes

## 2024-04-11 LAB
AMPHETAMINES SERPL QL SCN: NEGATIVE NG/ML
ANION GAP SERPL CALC-SCNC: 4 MMOL/L (ref 5–15)
BACTERIA SPEC CULT: NORMAL
BACTERIA SPEC CULT: NORMAL
BARBITURATES SERPL QL SCN: NEGATIVE UG/ML
BENZODIAZ SERPL QL SCN: NEGATIVE NG/ML
BUN SERPL-MCNC: 8 MG/DL (ref 6–20)
BUN/CREAT SERPL: 9 (ref 12–20)
CALCIUM SERPL-MCNC: 9.5 MG/DL (ref 8.5–10.1)
CANNABINOIDS SERPL QL SCN: NEGATIVE NG/ML
CHLORIDE SERPL-SCNC: 102 MMOL/L (ref 97–108)
CO2 SERPL-SCNC: 31 MMOL/L (ref 21–32)
COCAINE+BZE SERPL QL SCN: NEGATIVE NG/ML
CREAT SERPL-MCNC: 0.9 MG/DL (ref 0.7–1.3)
CRP SERPL-MCNC: 1.19 MG/DL (ref 0–0.3)
ERYTHROCYTE [DISTWIDTH] IN BLOOD BY AUTOMATED COUNT: 20.4 % (ref 11.5–14.5)
FENTANYL: NEGATIVE NG/ML
GLUCOSE BLD STRIP.AUTO-MCNC: 149 MG/DL (ref 65–117)
GLUCOSE SERPL-MCNC: 110 MG/DL (ref 65–100)
HCT VFR BLD AUTO: 47.1 % (ref 36.6–50.3)
HGB BLD-MCNC: 14.7 G/DL (ref 12.1–17)
MAGNESIUM SERPL-MCNC: 2.2 MG/DL (ref 1.6–2.4)
MCH RBC QN AUTO: 24.1 PG (ref 26–34)
MCHC RBC AUTO-ENTMCNC: 31.2 G/DL (ref 30–36.5)
MCV RBC AUTO: 77.3 FL (ref 80–99)
MEPERIDINE: NEGATIVE NG/ML
METHADONE SERPL QL SCN: NEGATIVE NG/ML
NRBC # BLD: 0 K/UL (ref 0–0.01)
NRBC BLD-RTO: 0 PER 100 WBC
OPIATES SERPL QL SCN: NEGATIVE NG/ML
OXYCODONE+OXYMORPHONE SERPLBLD QL SCN: NEGATIVE NG/ML
PCP SERPL QL SCN: NEGATIVE NG/ML
PHOSPHATE SERPL-MCNC: 3.2 MG/DL (ref 2.6–4.7)
PLATELET # BLD AUTO: 329 K/UL (ref 150–400)
PMV BLD AUTO: 9.3 FL (ref 8.9–12.9)
POTASSIUM SERPL-SCNC: 3.8 MMOL/L (ref 3.5–5.1)
PROPOXYPH SERPL QL SCN: NEGATIVE NG/ML
RBC # BLD AUTO: 6.09 M/UL (ref 4.1–5.7)
SERVICE CMNT-IMP: ABNORMAL
SERVICE CMNT-IMP: NORMAL
SERVICE CMNT-IMP: NORMAL
SODIUM SERPL-SCNC: 137 MMOL/L (ref 136–145)
TRAMADOL: NEGATIVE NG/ML
WBC # BLD AUTO: 7.9 K/UL (ref 4.1–11.1)

## 2024-04-11 PROCEDURE — 84100 ASSAY OF PHOSPHORUS: CPT

## 2024-04-11 PROCEDURE — 2580000003 HC RX 258: Performed by: STUDENT IN AN ORGANIZED HEALTH CARE EDUCATION/TRAINING PROGRAM

## 2024-04-11 PROCEDURE — 6370000000 HC RX 637 (ALT 250 FOR IP): Performed by: STUDENT IN AN ORGANIZED HEALTH CARE EDUCATION/TRAINING PROGRAM

## 2024-04-11 PROCEDURE — 6360000002 HC RX W HCPCS: Performed by: STUDENT IN AN ORGANIZED HEALTH CARE EDUCATION/TRAINING PROGRAM

## 2024-04-11 PROCEDURE — 97116 GAIT TRAINING THERAPY: CPT

## 2024-04-11 PROCEDURE — 97110 THERAPEUTIC EXERCISES: CPT

## 2024-04-11 PROCEDURE — 86140 C-REACTIVE PROTEIN: CPT

## 2024-04-11 PROCEDURE — 80048 BASIC METABOLIC PNL TOTAL CA: CPT

## 2024-04-11 PROCEDURE — 6370000000 HC RX 637 (ALT 250 FOR IP): Performed by: INTERNAL MEDICINE

## 2024-04-11 PROCEDURE — 1100000000 HC RM PRIVATE

## 2024-04-11 PROCEDURE — 94761 N-INVAS EAR/PLS OXIMETRY MLT: CPT

## 2024-04-11 PROCEDURE — 82962 GLUCOSE BLOOD TEST: CPT

## 2024-04-11 PROCEDURE — 2580000003 HC RX 258: Performed by: INTERNAL MEDICINE

## 2024-04-11 PROCEDURE — 36415 COLL VENOUS BLD VENIPUNCTURE: CPT

## 2024-04-11 PROCEDURE — 83735 ASSAY OF MAGNESIUM: CPT

## 2024-04-11 PROCEDURE — 6360000002 HC RX W HCPCS: Performed by: INTERNAL MEDICINE

## 2024-04-11 PROCEDURE — 85027 COMPLETE CBC AUTOMATED: CPT

## 2024-04-11 RX ADMIN — SODIUM CHLORIDE 1250 MG: 9 INJECTION, SOLUTION INTRAVENOUS at 22:09

## 2024-04-11 RX ADMIN — ENOXAPARIN SODIUM 40 MG: 100 INJECTION SUBCUTANEOUS at 09:52

## 2024-04-11 RX ADMIN — HALOPERIDOL 5 MG: 5 TABLET ORAL at 20:55

## 2024-04-11 RX ADMIN — BENZTROPINE MESYLATE 1 MG: 1 TABLET ORAL at 09:53

## 2024-04-11 RX ADMIN — METRONIDAZOLE 500 MG: 500 TABLET ORAL at 22:09

## 2024-04-11 RX ADMIN — BENZTROPINE MESYLATE 1 MG: 1 TABLET ORAL at 20:55

## 2024-04-11 RX ADMIN — HALOPERIDOL 5 MG: 5 TABLET ORAL at 09:53

## 2024-04-11 RX ADMIN — WATER 2000 MG: 1 INJECTION INTRAMUSCULAR; INTRAVENOUS; SUBCUTANEOUS at 09:53

## 2024-04-11 RX ADMIN — METRONIDAZOLE 500 MG: 500 TABLET ORAL at 05:14

## 2024-04-11 RX ADMIN — ACETAMINOPHEN 650 MG: 325 TABLET ORAL at 17:12

## 2024-04-11 RX ADMIN — SODIUM CHLORIDE, PRESERVATIVE FREE 10 ML: 5 INJECTION INTRAVENOUS at 09:55

## 2024-04-11 RX ADMIN — Medication 1 CAPSULE: at 09:55

## 2024-04-11 RX ADMIN — SODIUM CHLORIDE, PRESERVATIVE FREE 10 ML: 5 INJECTION INTRAVENOUS at 20:56

## 2024-04-11 RX ADMIN — OXYCODONE 5 MG: 5 TABLET ORAL at 20:55

## 2024-04-11 RX ADMIN — METRONIDAZOLE 500 MG: 500 TABLET ORAL at 14:03

## 2024-04-11 RX ADMIN — SODIUM CHLORIDE 1250 MG: 9 INJECTION, SOLUTION INTRAVENOUS at 10:05

## 2024-04-11 ASSESSMENT — PAIN SCALES - GENERAL: PAINLEVEL_OUTOF10: 7

## 2024-04-11 ASSESSMENT — PAIN DESCRIPTION - LOCATION: LOCATION: FOOT

## 2024-04-11 ASSESSMENT — PAIN DESCRIPTION - ORIENTATION: ORIENTATION: RIGHT

## 2024-04-12 LAB
COMMENT:: NORMAL
CREAT SERPL-MCNC: 0.84 MG/DL (ref 0.7–1.3)
SPECIMEN HOLD: NORMAL
VANCOMYCIN SERPL-MCNC: 17.5 UG/ML

## 2024-04-12 PROCEDURE — 6370000000 HC RX 637 (ALT 250 FOR IP): Performed by: INTERNAL MEDICINE

## 2024-04-12 PROCEDURE — 2580000003 HC RX 258: Performed by: STUDENT IN AN ORGANIZED HEALTH CARE EDUCATION/TRAINING PROGRAM

## 2024-04-12 PROCEDURE — 82565 ASSAY OF CREATININE: CPT

## 2024-04-12 PROCEDURE — 6370000000 HC RX 637 (ALT 250 FOR IP): Performed by: STUDENT IN AN ORGANIZED HEALTH CARE EDUCATION/TRAINING PROGRAM

## 2024-04-12 PROCEDURE — 1100000000 HC RM PRIVATE

## 2024-04-12 PROCEDURE — 6360000002 HC RX W HCPCS: Performed by: STUDENT IN AN ORGANIZED HEALTH CARE EDUCATION/TRAINING PROGRAM

## 2024-04-12 PROCEDURE — 2580000003 HC RX 258: Performed by: INTERNAL MEDICINE

## 2024-04-12 PROCEDURE — 80202 ASSAY OF VANCOMYCIN: CPT

## 2024-04-12 PROCEDURE — 94761 N-INVAS EAR/PLS OXIMETRY MLT: CPT

## 2024-04-12 PROCEDURE — 99232 SBSQ HOSP IP/OBS MODERATE 35: CPT | Performed by: PODIATRIST

## 2024-04-12 PROCEDURE — 97116 GAIT TRAINING THERAPY: CPT

## 2024-04-12 PROCEDURE — 6360000002 HC RX W HCPCS: Performed by: INTERNAL MEDICINE

## 2024-04-12 PROCEDURE — 99232 SBSQ HOSP IP/OBS MODERATE 35: CPT | Performed by: INTERNAL MEDICINE

## 2024-04-12 PROCEDURE — 36415 COLL VENOUS BLD VENIPUNCTURE: CPT

## 2024-04-12 RX ORDER — AMOXICILLIN AND CLAVULANATE POTASSIUM 875; 125 MG/1; MG/1
1 TABLET, FILM COATED ORAL EVERY 12 HOURS SCHEDULED
Status: DISCONTINUED | OUTPATIENT
Start: 2024-04-12 | End: 2024-04-16 | Stop reason: HOSPADM

## 2024-04-12 RX ORDER — DOXYCYCLINE HYCLATE 100 MG
100 TABLET ORAL EVERY 12 HOURS SCHEDULED
Status: DISCONTINUED | OUTPATIENT
Start: 2024-04-12 | End: 2024-04-16 | Stop reason: HOSPADM

## 2024-04-12 RX ADMIN — SODIUM CHLORIDE, PRESERVATIVE FREE 10 ML: 5 INJECTION INTRAVENOUS at 08:35

## 2024-04-12 RX ADMIN — BENZTROPINE MESYLATE 1 MG: 1 TABLET ORAL at 21:20

## 2024-04-12 RX ADMIN — SODIUM CHLORIDE, PRESERVATIVE FREE 10 ML: 5 INJECTION INTRAVENOUS at 21:20

## 2024-04-12 RX ADMIN — METRONIDAZOLE 500 MG: 500 TABLET ORAL at 05:58

## 2024-04-12 RX ADMIN — Medication 1 CAPSULE: at 08:31

## 2024-04-12 RX ADMIN — HALOPERIDOL 5 MG: 5 TABLET ORAL at 08:32

## 2024-04-12 RX ADMIN — OXYCODONE 5 MG: 5 TABLET ORAL at 17:56

## 2024-04-12 RX ADMIN — OXYCODONE 5 MG: 5 TABLET ORAL at 08:44

## 2024-04-12 RX ADMIN — METRONIDAZOLE 500 MG: 500 TABLET ORAL at 14:10

## 2024-04-12 RX ADMIN — HALOPERIDOL 5 MG: 5 TABLET ORAL at 21:20

## 2024-04-12 RX ADMIN — WATER 2000 MG: 1 INJECTION INTRAMUSCULAR; INTRAVENOUS; SUBCUTANEOUS at 08:31

## 2024-04-12 RX ADMIN — AMOXICILLIN AND CLAVULANATE POTASSIUM 1 TABLET: 875; 125 TABLET, FILM COATED ORAL at 21:19

## 2024-04-12 RX ADMIN — DOXYCYCLINE HYCLATE 100 MG: 100 TABLET, COATED ORAL at 21:19

## 2024-04-12 RX ADMIN — ENOXAPARIN SODIUM 40 MG: 100 INJECTION SUBCUTANEOUS at 08:31

## 2024-04-12 RX ADMIN — SODIUM CHLORIDE 1250 MG: 9 INJECTION, SOLUTION INTRAVENOUS at 10:32

## 2024-04-12 RX ADMIN — BENZTROPINE MESYLATE 1 MG: 1 TABLET ORAL at 08:32

## 2024-04-12 ASSESSMENT — PAIN DESCRIPTION - DESCRIPTORS
DESCRIPTORS: ACHING

## 2024-04-12 ASSESSMENT — PAIN DESCRIPTION - LOCATION
LOCATION: FOOT
LOCATION: TOE (COMMENT WHICH ONE)
LOCATION: FOOT
LOCATION: FOOT

## 2024-04-12 ASSESSMENT — PAIN SCALES - GENERAL
PAINLEVEL_OUTOF10: 3
PAINLEVEL_OUTOF10: 7
PAINLEVEL_OUTOF10: 7
PAINLEVEL_OUTOF10: 2
PAINLEVEL_OUTOF10: 4
PAINLEVEL_OUTOF10: 4

## 2024-04-12 ASSESSMENT — PAIN DESCRIPTION - ORIENTATION
ORIENTATION: RIGHT

## 2024-04-13 PROCEDURE — 6370000000 HC RX 637 (ALT 250 FOR IP): Performed by: INTERNAL MEDICINE

## 2024-04-13 PROCEDURE — 2580000003 HC RX 258: Performed by: INTERNAL MEDICINE

## 2024-04-13 PROCEDURE — 1100000000 HC RM PRIVATE

## 2024-04-13 PROCEDURE — 6360000002 HC RX W HCPCS: Performed by: INTERNAL MEDICINE

## 2024-04-13 PROCEDURE — 2700000000 HC OXYGEN THERAPY PER DAY

## 2024-04-13 PROCEDURE — 6370000000 HC RX 637 (ALT 250 FOR IP): Performed by: STUDENT IN AN ORGANIZED HEALTH CARE EDUCATION/TRAINING PROGRAM

## 2024-04-13 RX ADMIN — HALOPERIDOL 5 MG: 5 TABLET ORAL at 08:20

## 2024-04-13 RX ADMIN — ENOXAPARIN SODIUM 40 MG: 100 INJECTION SUBCUTANEOUS at 08:20

## 2024-04-13 RX ADMIN — HALOPERIDOL 5 MG: 5 TABLET ORAL at 20:45

## 2024-04-13 RX ADMIN — BENZTROPINE MESYLATE 1 MG: 1 TABLET ORAL at 08:20

## 2024-04-13 RX ADMIN — SODIUM CHLORIDE, PRESERVATIVE FREE 10 ML: 5 INJECTION INTRAVENOUS at 20:46

## 2024-04-13 RX ADMIN — BENZTROPINE MESYLATE 1 MG: 1 TABLET ORAL at 20:45

## 2024-04-13 RX ADMIN — DOXYCYCLINE HYCLATE 100 MG: 100 TABLET, COATED ORAL at 08:20

## 2024-04-13 RX ADMIN — SODIUM CHLORIDE, PRESERVATIVE FREE 10 ML: 5 INJECTION INTRAVENOUS at 08:21

## 2024-04-13 RX ADMIN — Medication 1 CAPSULE: at 08:20

## 2024-04-13 RX ADMIN — AMOXICILLIN AND CLAVULANATE POTASSIUM 1 TABLET: 875; 125 TABLET, FILM COATED ORAL at 08:20

## 2024-04-13 RX ADMIN — ACETAMINOPHEN 650 MG: 325 TABLET ORAL at 08:28

## 2024-04-13 RX ADMIN — DOXYCYCLINE HYCLATE 100 MG: 100 TABLET, COATED ORAL at 20:46

## 2024-04-13 RX ADMIN — AMOXICILLIN AND CLAVULANATE POTASSIUM 1 TABLET: 875; 125 TABLET, FILM COATED ORAL at 20:45

## 2024-04-13 ASSESSMENT — PAIN DESCRIPTION - DESCRIPTORS: DESCRIPTORS: ACHING

## 2024-04-13 ASSESSMENT — PAIN SCALES - GENERAL
PAINLEVEL_OUTOF10: 2
PAINLEVEL_OUTOF10: 2

## 2024-04-13 ASSESSMENT — PAIN DESCRIPTION - LOCATION: LOCATION: FOOT

## 2024-04-13 ASSESSMENT — PAIN DESCRIPTION - ORIENTATION: ORIENTATION: RIGHT

## 2024-04-14 PROCEDURE — 6370000000 HC RX 637 (ALT 250 FOR IP): Performed by: INTERNAL MEDICINE

## 2024-04-14 PROCEDURE — 2580000003 HC RX 258: Performed by: INTERNAL MEDICINE

## 2024-04-14 PROCEDURE — 94761 N-INVAS EAR/PLS OXIMETRY MLT: CPT

## 2024-04-14 PROCEDURE — 6370000000 HC RX 637 (ALT 250 FOR IP): Performed by: STUDENT IN AN ORGANIZED HEALTH CARE EDUCATION/TRAINING PROGRAM

## 2024-04-14 PROCEDURE — 6360000002 HC RX W HCPCS: Performed by: INTERNAL MEDICINE

## 2024-04-14 PROCEDURE — 1100000000 HC RM PRIVATE

## 2024-04-14 RX ADMIN — Medication 1 CAPSULE: at 08:43

## 2024-04-14 RX ADMIN — AMOXICILLIN AND CLAVULANATE POTASSIUM 1 TABLET: 875; 125 TABLET, FILM COATED ORAL at 21:01

## 2024-04-14 RX ADMIN — OXYCODONE 5 MG: 5 TABLET ORAL at 08:57

## 2024-04-14 RX ADMIN — ACETAMINOPHEN 650 MG: 325 TABLET ORAL at 00:55

## 2024-04-14 RX ADMIN — OXYCODONE 5 MG: 5 TABLET ORAL at 21:05

## 2024-04-14 RX ADMIN — SODIUM CHLORIDE, PRESERVATIVE FREE 10 ML: 5 INJECTION INTRAVENOUS at 21:02

## 2024-04-14 RX ADMIN — BENZTROPINE MESYLATE 1 MG: 1 TABLET ORAL at 08:43

## 2024-04-14 RX ADMIN — AMOXICILLIN AND CLAVULANATE POTASSIUM 1 TABLET: 875; 125 TABLET, FILM COATED ORAL at 08:43

## 2024-04-14 RX ADMIN — SODIUM CHLORIDE, PRESERVATIVE FREE 10 ML: 5 INJECTION INTRAVENOUS at 08:44

## 2024-04-14 RX ADMIN — DOXYCYCLINE HYCLATE 100 MG: 100 TABLET, COATED ORAL at 21:01

## 2024-04-14 RX ADMIN — BENZTROPINE MESYLATE 1 MG: 1 TABLET ORAL at 21:01

## 2024-04-14 RX ADMIN — ENOXAPARIN SODIUM 40 MG: 100 INJECTION SUBCUTANEOUS at 08:43

## 2024-04-14 RX ADMIN — HALOPERIDOL 5 MG: 5 TABLET ORAL at 21:01

## 2024-04-14 RX ADMIN — HALOPERIDOL 5 MG: 5 TABLET ORAL at 08:43

## 2024-04-14 RX ADMIN — DOXYCYCLINE HYCLATE 100 MG: 100 TABLET, COATED ORAL at 08:43

## 2024-04-14 ASSESSMENT — PAIN DESCRIPTION - DESCRIPTORS
DESCRIPTORS: DISCOMFORT
DESCRIPTORS: ACHING

## 2024-04-14 ASSESSMENT — PAIN SCALES - GENERAL
PAINLEVEL_OUTOF10: 3
PAINLEVEL_OUTOF10: 5
PAINLEVEL_OUTOF10: 5
PAINLEVEL_OUTOF10: 6
PAINLEVEL_OUTOF10: 3

## 2024-04-14 ASSESSMENT — PAIN DESCRIPTION - LOCATION
LOCATION: FOOT
LOCATION: FOOT;TOE (COMMENT WHICH ONE)
LOCATION: FOOT

## 2024-04-14 ASSESSMENT — PAIN DESCRIPTION - ORIENTATION
ORIENTATION: RIGHT

## 2024-04-15 PROBLEM — M86.9 RIGHT HALLUX OSTEOMYELITIS (HCC): Status: RESOLVED | Noted: 2024-04-08 | Resolved: 2024-04-15

## 2024-04-15 PROBLEM — S91.101A OPEN WOUND OF RIGHT GREAT TOE: Status: RESOLVED | Noted: 2024-04-08 | Resolved: 2024-04-15

## 2024-04-15 PROBLEM — F19.90 DRUG USE: Status: RESOLVED | Noted: 2024-04-10 | Resolved: 2024-04-15

## 2024-04-15 PROBLEM — G93.40 ENCEPHALOPATHY: Status: RESOLVED | Noted: 2024-04-05 | Resolved: 2024-04-15

## 2024-04-15 LAB
ANION GAP SERPL CALC-SCNC: 4 MMOL/L (ref 5–15)
BUN SERPL-MCNC: 16 MG/DL (ref 6–20)
BUN/CREAT SERPL: 16 (ref 12–20)
CALCIUM SERPL-MCNC: 9 MG/DL (ref 8.5–10.1)
CHLORIDE SERPL-SCNC: 103 MMOL/L (ref 97–108)
CO2 SERPL-SCNC: 27 MMOL/L (ref 21–32)
CREAT SERPL-MCNC: 1.02 MG/DL (ref 0.7–1.3)
CRP SERPL-MCNC: <0.29 MG/DL (ref 0–0.3)
ERYTHROCYTE [DISTWIDTH] IN BLOOD BY AUTOMATED COUNT: 19.5 % (ref 11.5–14.5)
GLUCOSE SERPL-MCNC: 109 MG/DL (ref 65–100)
HCT VFR BLD AUTO: 44.3 % (ref 36.6–50.3)
HGB BLD-MCNC: 14.1 G/DL (ref 12.1–17)
MAGNESIUM SERPL-MCNC: 2 MG/DL (ref 1.6–2.4)
MCH RBC QN AUTO: 24.5 PG (ref 26–34)
MCHC RBC AUTO-ENTMCNC: 31.8 G/DL (ref 30–36.5)
MCV RBC AUTO: 77 FL (ref 80–99)
NRBC # BLD: 0 K/UL (ref 0–0.01)
NRBC BLD-RTO: 0 PER 100 WBC
PHOSPHATE SERPL-MCNC: 3.6 MG/DL (ref 2.6–4.7)
PLATELET # BLD AUTO: 317 K/UL (ref 150–400)
PMV BLD AUTO: 9.7 FL (ref 8.9–12.9)
POTASSIUM SERPL-SCNC: 4 MMOL/L (ref 3.5–5.1)
RBC # BLD AUTO: 5.75 M/UL (ref 4.1–5.7)
SODIUM SERPL-SCNC: 134 MMOL/L (ref 136–145)
WBC # BLD AUTO: 6.5 K/UL (ref 4.1–11.1)

## 2024-04-15 PROCEDURE — 6370000000 HC RX 637 (ALT 250 FOR IP): Performed by: STUDENT IN AN ORGANIZED HEALTH CARE EDUCATION/TRAINING PROGRAM

## 2024-04-15 PROCEDURE — 2580000003 HC RX 258: Performed by: INTERNAL MEDICINE

## 2024-04-15 PROCEDURE — 80048 BASIC METABOLIC PNL TOTAL CA: CPT

## 2024-04-15 PROCEDURE — 83735 ASSAY OF MAGNESIUM: CPT

## 2024-04-15 PROCEDURE — 1100000000 HC RM PRIVATE

## 2024-04-15 PROCEDURE — 6370000000 HC RX 637 (ALT 250 FOR IP): Performed by: INTERNAL MEDICINE

## 2024-04-15 PROCEDURE — 36415 COLL VENOUS BLD VENIPUNCTURE: CPT

## 2024-04-15 PROCEDURE — 99232 SBSQ HOSP IP/OBS MODERATE 35: CPT | Performed by: PODIATRIST

## 2024-04-15 PROCEDURE — 94761 N-INVAS EAR/PLS OXIMETRY MLT: CPT

## 2024-04-15 PROCEDURE — 84100 ASSAY OF PHOSPHORUS: CPT

## 2024-04-15 PROCEDURE — 86140 C-REACTIVE PROTEIN: CPT

## 2024-04-15 PROCEDURE — 85027 COMPLETE CBC AUTOMATED: CPT

## 2024-04-15 RX ORDER — BENZTROPINE MESYLATE 1 MG/1
1 TABLET ORAL 2 TIMES DAILY
Qty: 16 TABLET | Refills: 0 | Status: SHIPPED | OUTPATIENT
Start: 2024-04-15 | End: 2024-04-16

## 2024-04-15 RX ORDER — AMOXICILLIN AND CLAVULANATE POTASSIUM 875; 125 MG/1; MG/1
1 TABLET, FILM COATED ORAL EVERY 12 HOURS SCHEDULED
Qty: 16 TABLET | Refills: 0 | Status: SHIPPED | OUTPATIENT
Start: 2024-04-15 | End: 2024-04-16

## 2024-04-15 RX ORDER — DOXYCYCLINE HYCLATE 100 MG
100 TABLET ORAL EVERY 12 HOURS SCHEDULED
Qty: 8 TABLET | Refills: 0 | Status: SHIPPED | OUTPATIENT
Start: 2024-04-15 | End: 2024-04-16

## 2024-04-15 RX ORDER — GABAPENTIN 100 MG/1
100 CAPSULE ORAL NIGHTLY
Status: DISCONTINUED | OUTPATIENT
Start: 2024-04-15 | End: 2024-04-16 | Stop reason: HOSPADM

## 2024-04-15 RX ORDER — HALOPERIDOL 5 MG/1
5 TABLET ORAL 2 TIMES DAILY
Qty: 120 TABLET | Refills: 3 | Status: SHIPPED | OUTPATIENT
Start: 2024-04-15 | End: 2024-04-16

## 2024-04-15 RX ORDER — GABAPENTIN 100 MG/1
100 CAPSULE ORAL NIGHTLY
Qty: 15 CAPSULE | Refills: 0 | Status: SHIPPED | OUTPATIENT
Start: 2024-04-15 | End: 2024-04-16

## 2024-04-15 RX ORDER — LACTOBACILLUS RHAMNOSUS GG 10B CELL
1 CAPSULE ORAL
Qty: 30 CAPSULE | Refills: 0 | Status: SHIPPED | OUTPATIENT
Start: 2024-04-16 | End: 2024-04-16

## 2024-04-15 RX ADMIN — HALOPERIDOL 5 MG: 5 TABLET ORAL at 21:18

## 2024-04-15 RX ADMIN — SODIUM CHLORIDE, PRESERVATIVE FREE 10 ML: 5 INJECTION INTRAVENOUS at 21:18

## 2024-04-15 RX ADMIN — DOXYCYCLINE HYCLATE 100 MG: 100 TABLET, COATED ORAL at 09:37

## 2024-04-15 RX ADMIN — SODIUM CHLORIDE, PRESERVATIVE FREE 5 ML: 5 INJECTION INTRAVENOUS at 09:39

## 2024-04-15 RX ADMIN — BENZTROPINE MESYLATE 1 MG: 1 TABLET ORAL at 21:18

## 2024-04-15 RX ADMIN — HALOPERIDOL 5 MG: 5 TABLET ORAL at 09:38

## 2024-04-15 RX ADMIN — Medication 1 CAPSULE: at 09:38

## 2024-04-15 RX ADMIN — GABAPENTIN 100 MG: 100 CAPSULE ORAL at 21:18

## 2024-04-15 RX ADMIN — DOXYCYCLINE HYCLATE 100 MG: 100 TABLET, COATED ORAL at 21:18

## 2024-04-15 RX ADMIN — AMOXICILLIN AND CLAVULANATE POTASSIUM 1 TABLET: 875; 125 TABLET, FILM COATED ORAL at 21:18

## 2024-04-15 RX ADMIN — AMOXICILLIN AND CLAVULANATE POTASSIUM 1 TABLET: 875; 125 TABLET, FILM COATED ORAL at 09:37

## 2024-04-15 RX ADMIN — OXYCODONE 5 MG: 5 TABLET ORAL at 06:16

## 2024-04-15 RX ADMIN — BENZTROPINE MESYLATE 1 MG: 1 TABLET ORAL at 09:38

## 2024-04-15 ASSESSMENT — PAIN SCALES - GENERAL: PAINLEVEL_OUTOF10: 6

## 2024-04-15 ASSESSMENT — PAIN DESCRIPTION - DESCRIPTORS: DESCRIPTORS: ACHING

## 2024-04-15 ASSESSMENT — PAIN DESCRIPTION - LOCATION: LOCATION: FOOT

## 2024-04-15 ASSESSMENT — PAIN DESCRIPTION - ORIENTATION: ORIENTATION: RIGHT

## 2024-04-15 NOTE — DISCHARGE INSTRUCTIONS
HOSPITALIST DISCHARGE INSTRUCTIONS  NAME:  Efraín Ruiz   :  1970   MRN:  [id number]     Date/Time:  4/15/2024 10:56 AM    ADMIT DATE: 2024     DISCHARGE DATE: 4/15/2024     DISCHARGE DIAGNOSIS:  Osteomyelitis  Schizophrenia      DISCHARGE INSTRUCTIONS:  Thank you for allowing us to participate in your care. Your discharging Hospitalist is Trae Don MD. You were admitted for evaluation and treatment of the above.       MEDICATIONS:    It is important that you take the medication exactly as they are prescribed.   Keep your medication in the bottles provided by the pharmacist and keep a list of the medication names, dosages, and times to be taken in your wallet.   Do not take other medications without consulting your doctor.             If you experience any of the following symptoms then please call your primary care physician or return to the emergency room if you cannot get hold of your doctor:  Fever, chills, nausea, vomiting, diarrhea, change in mentation, falling, bleeding, shortness of breath    Follow Up:  Please call the below provider to arrange hospital follow up appointment      Luna Wharton MD  [address]  [facility] [id number] [phone]            For questions regarding your Hospitalization or to contact the Hospital Medicine team, please call [phone].      Information obtained by :  I understand that if any problems occur once I am at home I am to contact my physician.    I understand and acknowledge receipt of the instructions indicated above.                                                                                                                                           Physician's or R.N.'s Signature                                                                  Date/Time                                                                                                                                              Patient or Representative

## 2024-04-15 NOTE — PROGRESS NOTES
[facility] PODIATRY & FOOT SURGERY       Progress Note    Date:2024       Room:[facility]/  Patient Name:Efraín Ruiz     YOB: 1970     Age:53 y.o.    Subjective:     Patient is a 53 y.o. male who is being seen at bedside status post right hallux amputation date of surgery 2024.  Review of Systems   Constitutional:  Negative for activity change, appetite change, chills, fatigue and fever.   HENT:  Negative for ear pain.    Respiratory:  Negative for shortness of breath.    Cardiovascular:  Negative for leg swelling.   Gastrointestinal:  Negative for abdominal pain, diarrhea and vomiting.   Skin:  Positive for wound.   Neurological:  Positive for numbness. Negative for weakness.   Psychiatric/Behavioral:  Negative for behavioral problems. The patient is not nervous/anxious.        Objective:     Vitals Last 24 Hours:  TEMPERATURE:  Temp  Av.9 °F (36.6 °C)  Min: 97.3 °F (36.3 °C)  Max: 98.6 °F (37 °C)  RESPIRATIONS RANGE: Resp  Av.9  Min: 16  Max: 20  PULSE OXIMETRY RANGE: SpO2  Av %  Min: 95 %  Max: 100 %  PULSE RANGE: Pulse  Av.8  Min: 64  Max: 89  BLOOD PRESSURE RANGE: Systolic (24hrs), Av , Min:94 , Max:132        Diastolic (24hrs), Av, Min:65, Max:82        I/O (24Hr):  No intake or output data in the 24 hours ending 24 0814    Physical Exam:    GEN: Pt is AAOx4 and in NAD.  Dressing noted to the right lower extremity no family noted at BS  RESP: Breathing is non labored with symmetrical chest wall expansion  DERM: Incision noted to the right foot  VASC: CFT<3sec to all digits of B/L LE. Pedal hair growth noted to the level of the digits for B/L LE. Skin temp is warm to warm from proximal to distal for B/L LE. No varicosities or telangectasias noted to B/L LE.  NEURO: Protective and epicritic sensations grossly intact to B/L LE  MSK: (-) POP, No gross deformities. Good muscle tone and bulk noted to B/L LE.  PSYCH: Cooperative 
  [facility] PODIATRY & FOOT SURGERY       Progress Note    Date:2024       Room[facility]  Patient Name:Efraín Ruiz     YOB: 1970     Age:53 y.o.    Subjective:     Patient is a 53 y.o. male who is being seen at bedside status post right hallux amputation date of surgery 2024. No new complaints at this time.  Patient is seen with sitter at bedside.  Patient is currently homeless.    Review of Systems   Constitutional:  Negative for activity change, appetite change, chills, fatigue and fever.   HENT:  Negative for ear pain.    Respiratory:  Negative for shortness of breath.    Cardiovascular:  Negative for leg swelling.   Gastrointestinal:  Negative for abdominal pain, diarrhea and vomiting.   Skin:  Positive for wound.   Neurological:  Positive for numbness. Negative for weakness.   Psychiatric/Behavioral:  Negative for behavioral problems. The patient is not nervous/anxious.      Objective:     Vitals Last 24 Hours:  TEMPERATURE:  Temp  Av °F (36.7 °C)  Min: 97.3 °F (36.3 °C)  Max: 98.6 °F (37 °C)  RESPIRATIONS RANGE: Resp  Av.1  Min: 16  Max: 20  PULSE OXIMETRY RANGE: SpO2  Av.2 %  Min: 95 %  Max: 100 %  PULSE RANGE: Pulse  Av  Min: 64  Max: 89  BLOOD PRESSURE RANGE: Systolic (24hrs), Av , Min:94 , Max:117        Diastolic (24hrs), Av, Min:65, Max:82        I/O (24Hr):    Intake/Output Summary (Last 24 hours) at 2024 1329  Last data filed at 2024 1113  Gross per 24 hour   Intake 650 ml   Output --   Net 650 ml     Physical Exam:    GEN: Pt is AAOx4 and in NAD.  Dressing noted to the right lower extremity no family noted at BS  RESP: Breathing is non labored with symmetrical chest wall expansion  DERM: Incision noted to the right foot  VASC: CFT<3sec to all digits of B/L LE. Pedal hair growth noted to the level of the digits for B/L LE. Skin temp is warm to warm from proximal to distal for B/L LE. No varicosities or 
1800: RN attempted to call pre op in regards to a time for patients surgery.; received no answer. Patient angry d/t NPO status.     1842: RN spoke with nursing supervisor regarding this issue. RN to receive a call back.     1855: RN received call from supervisor stating surgery is scheduled for around 2130.   
Comprehensive Nutrition Assessment    Type and Reason for Visit: Initial, RD Nutrition Re-Screen/LOS    Nutrition Recommendations/Plan:   Continue 4 carb diet - BG ~147 mg/dL  Added Ensure HP x 1. Nahun BID.        Malnutrition Assessment:  Malnutrition Status:  At risk for malnutrition (Comment) (04/12/24 1326)    Context:  Chronic Illness     Findings of the 6 clinical characteristics of malnutrition:  Energy Intake:  Mild decrease in energy intake (Comment)  Weight Loss:  No significant weight loss     Body Fat Loss:  No significant body fat loss     Muscle Mass Loss:  No significant muscle mass loss    Fluid Accumulation:  No significant fluid accumulation     Strength:  Not Performed       Nutrition Assessment:    Past medical hx:       Diagnosis Date    Schizophrenia (HCC)      Pt screened for LOS. Pt POD 3 R hallux amputation by Podiatry. Awaiting cultures. ID following for abx. Pt homeless. Pt is eating well while admitted. Today at all of breakfast. Added Nahun BID for wound healing and Ensure HP for increased kcal/protein intake with lower carbohydrate load. No known food allergies. No chew/swallow difficulties. No n/vc, No c/d. Last BM 2 days ago. Will continue to monitor for PO intake.         Current Nutrition Therapies:  ADULT DIET; Regular; 4 carb choices (60 gm/meal)  Meal Intake:   Patient Vitals for the past 168 hrs:   PO Meals Eaten (%)   04/12/24 0830 76 - 100%   04/10/24 1543 26 - 50%   04/10/24 1047 51 - 75%   04/08/24 1526 51 - 75%   04/08/24 0953 26 - 50%   04/07/24 0931 76 - 100%     Supplement Intake:  No data found.  Nutrition Support: n/a    Nutritionally Significant Medications:  Scheduled Meds:   vancomycin  1,250 mg IntraVENous Q12H    metroNIDAZOLE  500 mg Oral 3 times per day    haloperidol  5 mg Oral BID    benztropine  1 mg Oral BID    cefTRIAXone (ROCEPHIN) IV  2,000 mg IntraVENous Q24H    lactobacillus  1 capsule Oral Daily with breakfast    sodium chloride flush  5-40 mL 
[facility] Dosing Services: Antimicrobial Stewardship Daily Doc  Consult for antibiotic dosing of Vancomycin by Dr. Cook  Indication: Bone and joint infection/skin infection  Day of Therapy: 2    Ht Readings from Last 1 Encounters:   04/05/24 1.778 m (5' 10\")        Wt Readings from Last 1 Encounters:   04/05/24 77.1 kg (170 lb)      Vancomycin therapy:  Loading dose: Vancomycin 2000 mg x 1 dose now/given  Maintenance dose: Vancomycin 1250 mg IV every 12 hours   Dose calculated to approximate a           a. Target AUC/JACINTO of 400-600          b. Trough of 15-20 mcg/mL   Last level:  N/A - new start   Dose administration notes: Doses given appropriately as scheduled  Assess/Plan: WBC = 8; Afeb; Tachycardic this am; Wound/Blood cultures and MRSA screening pending; Continue current dose; Draw random level at 1800 this evening -->ordered.     Non-Kinetic Antimicrobial Dosing Regimen:   Current Regimen: Cefepime 2g IV every 8 hours  Recommendation: Continue  Dose administration notes:     Other Antimicrobial   (not dosed by pharmacist) Flagyl   Cultures 4/5 Blood x 2 - pending  4/6 Wound - pending  4/6 MRSA screen - pending     Serum Creatinine Lab Results   Component Value Date/Time    CREATININE 0.93 04/05/2024 07:11 PM          Creatinine Clearance Estimated Creatinine Clearance: 96 mL/min (based on SCr of 0.93 mg/dL).     Temp Temp: 98.9 °F (37.2 °C) (Oral)       WBC Lab Results   Component Value Date/Time    WBC 8.1 04/05/2024 07:11 PM          Procalcitonin Lab Results   Component Value Date/Time    PROCAL <0.05 04/05/2024 07:11 PM      For Antifungals, Metronidazole and Nafcillin: Lab Results   Component Value Date/Time    ALT 21 04/05/2024 07:11 PM    AST 34 04/05/2024 07:11 PM        Pharmacist: Andra Garcia, Pharm.D.   [phone]     
[facility]  [address] [place] [address] [phone]        Hospitalist Progress Note      NAME: Efraín Ruiz   :  1970  MRM:  [id number]    Date/Time of service: 2024  10:25 AM       Subjective:     Chief Complaint:  Patient was personally seen and examined by me during this time period.  Chart reviewed.  F/up confusion, unknown intoxication. Cellulitis with possible osteo of toe.    Remains confused and mumbling - roughly same as yesterday. No pain or specific complaints       Objective:       Vitals:       Last 24hrs VS reviewed since prior progress note. Most recent are:    Vitals:    24 0737   BP: 102/62   Pulse: 95   Resp: 16   Temp: 98.1 °F (36.7 °C)   SpO2: 95%     SpO2 Readings from Last 6 Encounters:   24 95%          Intake/Output Summary (Last 24 hours) at 2024 1025  Last data filed at 2024 0931  Gross per 24 hour   Intake 1115.5 ml   Output 1900 ml   Net -784.5 ml          Exam:     Physical Exam:    Gen:   in no acute distress  HEENT:  Pink conjunctivae, EOMI, hearing intact to voice, moist mucous membranes  Resp:  No accessory muscle use, clear breath sounds without wheezes rales or rhonchi  Card:  No murmurs, normal S1, S2 without thrills, bruits  Abd:  Soft, non-tender, non-distended  Musc:  No cyanosis or clubbing  Skin:  cellulitis  Neuro:  follows commands appropriately  Psych:  poor insight, oriented to person, place, alert      Medications Reviewed: (see below)    Lab Data Reviewed: (see below)    ______________________________________________________________________    Medications:     Current Facility-Administered Medications   Medication Dose Route Frequency    OLANZapine (ZYPREXA) tablet 2.5 mg  2.5 mg Oral Nightly    ceFEPIme (MAXIPIME) 2,000 mg in sodium chloride 0.9 % 100 mL IVPB (mini-bag)  2,000 mg IntraVENous Q8H    metroNIDAZOLE (FLAGYL) 500 mg in 0.9% NaCl 100 mL IVPB premix  500 mg IntraVENous Q8H 
[facility]  [address] [place] [address] [phone]        Hospitalist Progress Note      NAME: Efraín Ruiz   :  1970  MRM:  [id number]    Date/Time of service: 2024  10:24 AM       Subjective:     Chief Complaint:  Patient was personally seen and examined by me during this time period.  Chart reviewed.  Pleasant, no acute issues       Objective:       Vitals:       Last 24hrs VS reviewed since prior progress note. Most recent are:    Vitals:    24 0713   BP: 94/65   Pulse: 65   Resp: 20   Temp: 98.2 °F (36.8 °C)   SpO2: 95%     SpO2 Readings from Last 6 Encounters:   24 95%        No intake or output data in the 24 hours ending 24 1024       Exam:     Physical Exam:    Gen:  Well-developed, well-nourished, in no acute distress  HEENT:  Pink conjunctivae, PERRL, hearing intact to voice, moist mucous membranes  Neck:  Supple, without masses, thyroid non-tender  Resp:  No accessory muscle use, clear breath sounds without wheezes rales or rhonchi  Card:  No murmurs, normal S1, S2 without thrills, bruits or peripheral edema  Abd:  Soft, non-tender, non-distended, normoactive bowel sounds are present  Musc:  No cyanosis or clubbing  Skin:  right foot dressing c/d/i  Neuro:  Cranial nerves 3-12 are grossly intact, follows commands appropriately  Psych:  poor insight, oriented to person, place. Occasional tangential thoughts and hallucinations     Medications Reviewed: (see below)    Lab Data Reviewed: (see below)    ______________________________________________________________________    Medications:     Current Facility-Administered Medications   Medication Dose Route Frequency    metroNIDAZOLE (FLAGYL) tablet 500 mg  500 mg Oral 3 times per day    haloperidol lactate (HALDOL) injection 2 mg  2 mg IntraVENous Q6H PRN    HYDROmorphone (DILAUDID) injection 0.5 mg  0.5 mg IntraVENous Q4H PRN    oxyCODONE (ROXICODONE) immediate release 
[facility]  [address] [place] [address] [phone]        Hospitalist Progress Note      NAME: Efraín Ruiz   :  1970  MRM:  [id number]    Date/Time of service: 2024  10:21 AM       Subjective:     Chief Complaint:  Patient was personally seen and examined by me during this time period.  Chart reviewed.  No fevers, chills       Objective:       Vitals:       Last 24hrs VS reviewed since prior progress note. Most recent are:    Vitals:    24 0702   BP: 106/78   Pulse: 70   Resp: 16   Temp: 98.8 °F (37.1 °C)   SpO2: 97%     SpO2 Readings from Last 6 Encounters:   24 97%          Intake/Output Summary (Last 24 hours) at 2024 1021  Last data filed at 2024 0210  Gross per 24 hour   Intake 1200 ml   Output --   Net 1200 ml          Exam:     Physical Exam:    Gen:  Well-developed, well-nourished, in no acute distress  HEENT:  Pink conjunctivae, PERRL, hearing intact to voice, moist mucous membranes  Neck:  Supple, without masses, thyroid non-tender  Resp:  No accessory muscle use, clear breath sounds without wheezes rales or rhonchi  Card:  No murmurs, normal S1, S2 without thrills, bruits or peripheral edema  Abd:  Soft, non-tender, non-distended, normoactive bowel sounds are present  Musc:  No cyanosis or clubbing  Skin:  right foot dressing c/d/i  Neuro:  Cranial nerves 3-12 are grossly intact, follows commands appropriately  Psych:  poor insight, oriented to person, place. Occasional tangential thoughts and hallucinations     Medications Reviewed: (see below)    Lab Data Reviewed: (see below)    ______________________________________________________________________    Medications:     Current Facility-Administered Medications   Medication Dose Route Frequency    amoxicillin-clavulanate (AUGMENTIN) 875-125 MG per tablet 1 tablet  1 tablet Oral 2 times per day    doxycycline hyclate (VIBRA-TABS) tablet 100 mg  100 mg Oral 2 times 
[facility]  [address] [place] [address] [phone]        Hospitalist Progress Note      NAME: Efraín Ruiz   :  1970  MRM:  [id number]    Date/Time of service: 4/15/2024  2:57 PM       Subjective:     Chief Complaint:  Patient was personally seen and examined by me during this time period.  Chart reviewed.    Resting, reporting burning sensation at amputation site, discussed with him to start him on spencer which he is willing to try        Objective:       Vitals:       Last 24hrs VS reviewed since prior progress note. Most recent are:    Vitals:    04/15/24 1455   BP: 101/62   Pulse: 73   Resp:    Temp: 97.9 °F (36.6 °C)   SpO2: 96%     SpO2 Readings from Last 6 Encounters:   04/15/24 96%          Intake/Output Summary (Last 24 hours) at 4/15/2024 1457  Last data filed at 4/15/2024 0619  Gross per 24 hour   Intake --   Output 2500 ml   Net -2500 ml          Exam:     Physical Exam:    Gen:  Well-developed, well-nourished, in no acute distress  HEENT:  Pink conjunctivae, PERRL, hearing intact to voice, moist mucous membranes  Neck:  Supple, without masses, thyroid non-tender  Resp:  No accessory muscle use, clear breath sounds without wheezes rales or rhonchi  Card:  No murmurs, normal S1, S2 without thrills, bruits or peripheral edema  Abd:  Soft, non-tender, non-distended, normoactive bowel sounds are present  Musc:  No cyanosis or clubbing  Skin:  right foot dressing c/d/i  Neuro:  Cranial nerves 3-12 are grossly intact, follows commands appropriately  Psych:  poor insight, oriented to person, place. Occasional tangential thoughts and hallucinations     Medications Reviewed: (see below)    Lab Data Reviewed: (see below)    ______________________________________________________________________    Medications:     Current Facility-Administered Medications   Medication Dose Route Frequency    gabapentin (NEURONTIN) capsule 100 mg  100 mg Oral Nightly    
[facility]  [address] [place] [address] [phone]        Hospitalist Progress Note      NAME: Efraín Ruiz   :  1970  MRM:  [id number]    Date/Time of service: 2024  10:33 AM       Subjective:     Chief Complaint:  Patient was personally seen and examined by me during this time period.  Chart reviewed.  F/up confusion, unknown intoxication. Cellulitis with possible osteo of toe.    Remains confused. Telling me he can hear his brother taking with a woman outside his door. No pain or specific complaints.       Objective:       Vitals:       Last 24hrs VS reviewed since prior progress note. Most recent are:    Vitals:    24 0751   BP: 128/80   Pulse: 99   Resp: 16   Temp: 98.4 °F (36.9 °C)   SpO2: 98%     SpO2 Readings from Last 6 Encounters:   24 98%          Intake/Output Summary (Last 24 hours) at 2024 1033  Last data filed at 2024 0953  Gross per 24 hour   Intake 3125.05 ml   Output 2910 ml   Net 215.05 ml          Exam:     Physical Exam:    Gen:   in no acute distress  HEENT:  Pink conjunctivae, EOMI, hearing intact to voice, moist mucous membranes  Resp:  No accessory muscle use, clear breath sounds without wheezes rales or rhonchi  Card:  No murmurs, normal S1, S2 without thrills, bruits  Abd:  Soft, non-tender, non-distended  Musc:  No cyanosis or clubbing  Skin:  cellulitis  Neuro:  follows commands appropriately  Psych:  poor insight, oriented to person, place, alert      Medications Reviewed: (see below)    Lab Data Reviewed: (see below)    ______________________________________________________________________    Medications:     Current Facility-Administered Medications   Medication Dose Route Frequency    cefTRIAXone (ROCEPHIN) 1,000 mg in sterile water 10 mL IV syringe  1,000 mg IntraVENous Q24H    haloperidol (HALDOL) tablet 5 mg  5 mg Oral BID    benztropine (COGENTIN) tablet 1 mg  1 mg Oral BID    vancomycin 
[facility]  [address] [place] [address] [phone]        Hospitalist Progress Note      NAME: Efraín Ruiz   :  1970  MRM:  [id number]    Date/Time of service: 2024  9:24 AM       Subjective:     Chief Complaint:  Patient was personally seen and examined by me during this time period.  Chart reviewed.  No fevers, chills.  Restless        Objective:       Vitals:       Last 24hrs VS reviewed since prior progress note. Most recent are:    Vitals:    24 0737   BP: 119/88   Pulse: 66   Resp: 17   Temp: 97.7 °F (36.5 °C)   SpO2: 94%     SpO2 Readings from Last 6 Encounters:   24 94%          Intake/Output Summary (Last 24 hours) at 2024 0924  Last data filed at 2024 0734  Gross per 24 hour   Intake 836.54 ml   Output 1975 ml   Net -1138.46 ml        Exam:     Physical Exam:    Gen:  Well-developed, well-nourished, in no acute distress  HEENT:  Pink conjunctivae, PERRL, hearing intact to voice, moist mucous membranes  Neck:  Supple, without masses, thyroid non-tender  Resp:  No accessory muscle use, clear breath sounds without wheezes rales or rhonchi  Card:  No murmurs, normal S1, S2 without thrills, bruits or peripheral edema  Abd:  Soft, non-tender, non-distended, normoactive bowel sounds are present  Musc:  No cyanosis or clubbing  Skin:  right foot dressing c/d/i  Neuro:  Cranial nerves 3-12 are grossly intact, follows commands appropriately  Psych:  poor insight, oriented to person, place and time, alert. Tangential thoughts, occasional hallucinations     Medications Reviewed: (see below)    Lab Data Reviewed: (see below)    ______________________________________________________________________    Medications:     Current Facility-Administered Medications   Medication Dose Route Frequency    lactated ringers IV soln infusion   IntraVENous Continuous    haloperidol lactate (HALDOL) injection 2 mg  2 mg IntraVENous Q6H PRN    
[facility] Dosing Services: Antimicrobial Stewardship Daily Doc  Consult for antibiotic dosing of Vancomycin by Dr. Cook  Indication: Bone and joint infection/skin infection  Day of Therapy: 3 of 7    Ht Readings from Last 1 Encounters:   04/05/24 1.778 m (5' 10\")        Wt Readings from Last 1 Encounters:   04/05/24 77.1 kg (170 lb)      Vancomycin therapy:  Loading dose: Vancomycin 2000 mg x 1 dose given 4/5 @ 2253  Maintenance dose: Vancomycin 1250 mg IV every 12 hours   Dose calculated to approximate a           a. Target AUC/JACINTO of 400-600          b. Trough of 15-20 mcg/mL   Last level:  16.7 mcg/ml (drawn 4/6 @ 1616) ~ 8 hour level post dose     Assess/Plan  WBC = 7.1; Afeb; Scr = 0.87; Procal (4/5) = <0.05  With most recent vanc level, will continue current dose as confirmed therapeutic (per insight rx predicts , Tr 13.8, T1/2 = 8 hours)  Given 7 day duration (stop date 4/12), will likely not need to check another level unless renal function or clinical condition changes.     Dose administration notes: Doses given appropriately as scheduled    Non-Kinetic Antimicrobial Dosing Regimen:   Current Regimen: Cefepime 2g IV every 8 hours  Recommendation: Continue  Dose administration notes:     Other Antimicrobial   (not dosed by pharmacist) Flagyl 500 mg every 8 hours   Cultures 4/6 Wound - Heavy probable Staph, heavy Proteus   4/6 MRSA screen - pending  4/5 Blood x 2 - pending     Serum Creatinine Lab Results   Component Value Date/Time    CREATININE 0.87 04/07/2024 01:11 AM          Creatinine Clearance Estimated Creatinine Clearance: 103 mL/min (based on SCr of 0.87 mg/dL).     Temp Temp: 98.1 °F (36.7 °C) (Oral)       WBC Lab Results   Component Value Date/Time    WBC 7.1 04/07/2024 01:11 AM          Procalcitonin Lab Results   Component Value Date/Time    PROCAL <0.05 04/05/2024 07:11 PM      For Antifungals, Metronidazole and Nafcillin: Lab Results   Component Value Date/Time    ALT 12 
Met with patient and offered PT gait training. He was given a Darco shoe for heel weight bearing to try after last PT visit, but he states it looks too heavy and he doesn't want to try it. Unable to engage patient in PT interventions at this time as he states he just wants to sleep until he is discharged. RN aware.  Catherine Yeung, PT, DPT   
[facility] Progress Note  Mayank Ray DO  [phone] Office  [phone] Fax    2024      Assessment & Plan:     Right great toe osteomyelitis.  Status post right hallux amputation 2024.  Surgical cure per podiatry so patient may be discharged on oral antibiotics.   Cultures growing MRSA, Proteus, and mixed skin senthil.  Recommend discharge on Augmentin 875 mg p.o. twice daily and doxycycline 100 mg p.o. twice daily x 7 days at discharge  Eosinophilia.  No complaints of rash or pruritus.  Monitor for possible developing drug reaction.  No antibiotic changes at this time  Schizophrenia.  Psychiatry following.  History of drug use.  Patient was in rehab prior to admission.  He denies IV drug abuse but admits to cocaine use.  Homelessness.  This will complicate wound care          Subjective:     No complaints    Objective:     Vitals: /70   Pulse 88   Temp 97.7 °F (36.5 °C) (Oral)   Resp 16   Ht 1.778 m (5' 10\")   Wt 77.1 kg (170 lb)   SpO2 98%   BMI 24.39 kg/m²      Tmax:  Temp (24hrs), Av °F (36.7 °C), Min:97.3 °F (36.3 °C), Max:98.6 °F (37 °C)      Exam:   Patient is intubated:  no    Physical Examination:   General:  Alert, cooperative, no distress   Head:  Normocephalic, atraumatic.   Eyes:  Conjunctivae clear   Neck: Supple       Lungs:   No distress.     Chest wall:     Heart:     Abdomen:   non-distended   Extremities: Moves all.  Foot dressed   Skin: No acute rash on exposed skin   Neurologic: No focal deficits     Labs:        Invalid input(s): \"ITNL\"   No results for input(s): \"CPK\", \"CKMB\" in the last 72 hours.    Invalid input(s): \"TROIQ\"  Recent Labs     24  0403      K 3.8      CO2 31   BUN 8   PHOS 3.2   MG 2.2   WBC 7.9   HGB 14.7   HCT 47.1        No results for input(s): \"INR\", \"APTT\" in the last 72 hours.    Invalid input(s): \"PTP\"  Needs: urine analysis, urine sodium, protein and creatinine  No results found for: 
Patient refused AM labs, states he is \"trying to sleep.\"  
Pharmacy Dosing Services: 4/10/24    The pharmacist has determined that this patient meets P & T approved criteria for conversion from IV to oral therapy for the following medication:Metronidazole      The pharmacist has written the following order for the patient: 500mg po q8h  The pharmacist will continue to monitor the patient's status and advise the physician if conversion back to IV therapy is recommended.    Signed Giovani Small RPH Contact information: [phone]    
[facility] Dosing Services: Antimicrobial Stewardship Daily Doc  Consult for antibiotic dosing of Vancomycin by Dr. Cook  Indication: bone and joint infection/skin infection  Day of Therapy: 1    Ht Readings from Last 1 Encounters:   04/05/24 1.778 m (5' 10\")        Wt Readings from Last 1 Encounters:   04/05/24 77.1 kg (170 lb)      Vancomycin therapy:  Loading dose: Vancomycin 2000 mg x1 dose now/given  Maintenance dose: Vancomycin 1250 mg IV every 12 hours   Dose calculated to approximate a           a. Target AUC/JACINTO of 400-600          b. Trough of 15-20 mcg/mL   Last level:  mcg/mL  Plan:   Dose administration notes: Doses given appropriately as scheduled    Non-Kinetic Antimicrobial Dosing Regimen:   Current Regimen:    Recommendation:   Dose administration notes:     Other Antimicrobial   (not dosed by pharmacist) Cefepime/flagyl   Cultures    Serum Creatinine Lab Results   Component Value Date/Time    CREATININE 0.93 04/05/2024 07:11 PM          Creatinine Clearance Estimated Creatinine Clearance: 96 mL/min (based on SCr of 0.93 mg/dL).     Temp Temp: 98.9 °F (37.2 °C) (Oral)       WBC Lab Results   Component Value Date/Time    WBC 8.1 04/05/2024 07:11 PM          Procalcitonin Lab Results   Component Value Date/Time    PROCAL <0.05 04/05/2024 07:11 PM      For Antifungals, Metronidazole and Nafcillin: Lab Results   Component Value Date/Time    ALT 21 04/05/2024 07:11 PM    AST 34 04/05/2024 07:11 PM        Pharmacist: Angel Gibbons  [phone]   
[facility] Dosing Services: Antimicrobial Stewardship Daily Doc  Consult for antibiotic dosing of Vancomycin by Dr. Cook  Indication: Bone and joint infection/skin infection  Day of Therapy: 2 of 7    Ht Readings from Last 1 Encounters:   04/05/24 1.778 m (5' 10\")        Wt Readings from Last 1 Encounters:   04/05/24 77.1 kg (170 lb)      Vancomycin therapy:  Loading dose: Vancomycin 2000 mg x 1 dose given 4/5 @ 2253  Maintenance dose: Vancomycin 1250 mg IV every 12 hours   Dose calculated to approximate a           a. Target AUC/JACINTO of 400-600          b. Trough of 15-20 mcg/mL   Last level:  16.7 mcg/ml (drawn 4/6 @ 1616) ~ 8 hour level post dose     Assess/Plan  WBC = 8; Afeb; Tachycardic  With most recent vanc level, will continue current dose as confirmed therapeutic (per insight rx predicts , Tr 13.8, T1/2 = 8 hours)    Dose administration notes: Doses given appropriately as scheduled    Non-Kinetic Antimicrobial Dosing Regimen:   Current Regimen: Cefepime 2g IV every 8 hours  Recommendation: Continue  Dose administration notes:     Other Antimicrobial   (not dosed by pharmacist) Flagyl 500 mg q8h   Cultures 4/6 Wound - pending  4/6 MRSA screen - pending  4/5 Blood x 2 - pending     Serum Creatinine Lab Results   Component Value Date/Time    CREATININE 0.87 04/06/2024 02:16 AM          Creatinine Clearance Estimated Creatinine Clearance: 103 mL/min (based on SCr of 0.87 mg/dL).     Temp Temp: 98.4 °F (36.9 °C) (Oral)       WBC Lab Results   Component Value Date/Time    WBC 8.0 04/06/2024 02:16 AM          Procalcitonin Lab Results   Component Value Date/Time    PROCAL <0.05 04/05/2024 07:11 PM      For Antifungals, Metronidazole and Nafcillin: Lab Results   Component Value Date/Time    ALT 16 04/06/2024 02:16 AM    AST 40 04/06/2024 02:16 AM        Pharmacist: Jorge Luis Recio, [facility]  [phone]       
Spiritual Care Assessment/Progress Note  [facility] [place] 2]    Name: Efraín Ruiz MRN: [id number]    Age: 53 y.o.     Sex: male   Language: English     Date: 4/12/2024            Total Time Calculated: 20 min              Spiritual Assessment begun in [facility]  Service Provided For:: Patient     Encounter Overview/Reason : Initial Encounter    Spiritual beliefs:      [x] Involved in a rubia tradition/spiritual practice: Amy     [] Supported by a rubia community:      [] Claims no spiritual orientation:      [] Seeking spiritual identity:           [] Adheres to an individual form of spirituality:      [] Not able to assess:                Identified resources for coping and support system:   Support System: /       [] Prayer                  [] Devotional reading               [] Music                  [] Guided Imagery     [] Pet visits                                        [] Other: (COMMENT)     Specific area/focus of visit   Encounter:    Crisis:    Spiritual/Emotional needs:    Ritual, Rites and Sacraments:    Grief, Loss, and Adjustments:    Ethics/Mediation:    Behavioral Health:    Palliative Care:    Advance Care Planning:      Plan/Referrals: Other (Comment) (Please contact [facility] for further consults.)    Narrative:   visit for the patient on Post Surg for length of stay. Reviewed pt's chart and spoke with pt's nurse. Pt and pt's sitter were present. Introduced self and chaplaincy. Pt shared he is ok. Pt shared he finds strength through his belief in God. Pt offered he wants people to see him with more than their eyes, to listen and hear him.  affimred a need to be seen and heard. Initiated a relationship of trust and support. Offered a listening ear. Reviewed coping. Shared  availability. Please contact [facility] for further referrals.    Chaplain Amber, MS, MDiv, 
Unable to do eeg. Due to scalp and hair condition.  
[facility] Vancomycin Pharmacy Consult 04/12/24  Vancomycin random level= 17.5 mcg/ml.   It predicts an   This is within goal    Plan:  Will continue current regimen    Thank you  Ann Cedillo, PharmD  [phone]    
Continuous    haloperidol lactate (HALDOL) injection 2 mg  2 mg IntraVENous Q6H PRN    HYDROmorphone (DILAUDID) injection 0.5 mg  0.5 mg IntraVENous Q4H PRN    oxyCODONE (ROXICODONE) immediate release tablet 5 mg  5 mg Oral Q4H PRN    haloperidol (HALDOL) tablet 5 mg  5 mg Oral BID    benztropine (COGENTIN) tablet 1 mg  1 mg Oral BID    vancomycin (VANCOCIN) 1,250 mg in sodium chloride 0.9 % 250 mL IVPB ([id number])  1,250 mg IntraVENous Q12H    cefTRIAXone (ROCEPHIN) 2,000 mg in sterile water 20 mL IV syringe  2,000 mg IntraVENous Q24H    lactobacillus (CULTURELLE) capsule 1 capsule  1 capsule Oral Daily with breakfast    sodium chloride flush 0.9 % injection 5-40 mL  5-40 mL IntraVENous 2 times per day    sodium chloride flush 0.9 % injection 5-40 mL  5-40 mL IntraVENous PRN    0.9 % sodium chloride infusion   IntraVENous PRN    enoxaparin (LOVENOX) injection 40 mg  40 mg SubCUTAneous Daily    ondansetron (ZOFRAN-ODT) disintegrating tablet 4 mg  4 mg Oral Q8H PRN    Or    ondansetron (ZOFRAN) injection 4 mg  4 mg IntraVENous Q6H PRN    polyethylene glycol (GLYCOLAX) packet 17 g  17 g Oral Daily PRN    acetaminophen (TYLENOL) tablet 650 mg  650 mg Oral Q6H PRN    Or    acetaminophen (TYLENOL) suppository 650 mg  650 mg Rectal Q6H PRN          Lab Review:     Recent Labs     04/08/24 0227 04/09/24  0254   WBC 6.0 7.3   HGB 12.2 14.2   HCT 38.0 45.3    321       Recent Labs     04/08/24 0227 04/09/24  0254    139   K 3.8 3.8   * 106   CO2 28 30   BUN 7 9   MG  --  2.2   PHOS  --  3.6       No results found for: \"GLUCPOC\"       Assessment / Plan:     54 yo hx of schizophrenia, drug abuse, presented w/ AMS, drug OD, 1st toe gangrene and osteomyelitis     1) R 1st toe gangrene/osteomyelitis: acute on chronic.  S/p R hallux amputation by Podiatry on 04/09.  Wound Cx w/ MRSA and proteus.  Will cont Vanc/CTX/Flagyl.  Awaiting ID eval    2) Acute met encephalopathy: now back to baseline.  Due to drug OD 
No gross deformities. Good muscle tone and bulk noted to KRISHAN/L LE.  PSYCH: Cooperative with normal mood and affect       Labs/Imaging/Diagnostics    Labs:  CBC:  Recent Labs     04/15/24  0359   WBC 6.5   RBC 5.75*   HGB 14.1   HCT 44.3   MCV 77.0*   RDW 19.5*        CHEMISTRIES:  Recent Labs     04/15/24  0359   *   K 4.0      CO2 27   BUN 16   CREATININE 1.02   GLUCOSE 109*   PHOS 3.6   MG 2.0     PT/INR:No results for input(s): \"PROTIME\", \"INR\" in the last 72 hours.  APTT:No results for input(s): \"APTT\" in the last 72 hours.  LIVER PROFILE:No results for input(s): \"AST\", \"ALT\", \"BILIDIR\", \"BILITOT\", \"ALKPHOS\" in the last 72 hours.    Imaging Last 24 Hours:  No results found.      Assessment//Plan           Hospital Problems             Last Modified POA    Neuropathy 4/8/2024 Yes    Sheltered homelessness 4/10/2024 Yes     Assessment & Plan    Right great toe osteomyelitis  Polysubstance abuse  Homelessness      Patient was seen and evaluated at bedside  - POD #6 d/p right great toe amputation  - Current labs personally reviewed and findings dicussed with patient  - Cont current wound care and offloading for wound healing purposes  - Abx per ID  - Patient is okay to be discharged from podiatry standpoint to follow-up in 2 weeks after being discharged   - We will follow and update recs as needed    Thank you!        Abilio Camacho, SUZIE, CWSP, AACFAS    [facility]  [address] [place] [address]  O: [phone]  F: [phone]    [facility]  [address] [facility] [address] [address]  [place] [address]  O: [phone]  F: [phone]    [facility]  [address], [address]  [place] [address]  O: [phone]  F: [phone]    * Available via [employer] 24/7      Electronically signed by Abilio Camacho, 
premix  500 mg IntraVENous Q8H    sodium chloride flush 0.9 % injection 5-40 mL  5-40 mL IntraVENous 2 times per day    sodium chloride flush 0.9 % injection 5-40 mL  5-40 mL IntraVENous PRN    0.9 % sodium chloride infusion   IntraVENous PRN    potassium chloride (KLOR-CON) extended release tablet 40 mEq  40 mEq Oral PRN    Or    potassium bicarb-citric acid (EFFER-K) effervescent tablet 40 mEq  40 mEq Oral PRN    Or    potassium chloride 10 mEq/100 mL IVPB (Peripheral Line)  10 mEq IntraVENous PRN    magnesium sulfate 2000 mg in 50 mL IVPB premix  2,000 mg IntraVENous PRN    enoxaparin (LOVENOX) injection 40 mg  40 mg SubCUTAneous Daily    ondansetron (ZOFRAN-ODT) disintegrating tablet 4 mg  4 mg Oral Q8H PRN    Or    ondansetron (ZOFRAN) injection 4 mg  4 mg IntraVENous Q6H PRN    polyethylene glycol (GLYCOLAX) packet 17 g  17 g Oral Daily PRN    acetaminophen (TYLENOL) tablet 650 mg  650 mg Oral Q6H PRN    Or    acetaminophen (TYLENOL) suppository 650 mg  650 mg Rectal Q6H PRN    vancomycin (VANCOCIN) 1,250 mg in sodium chloride 0.9 % 250 mL IVPB ([id number])  1,250 mg IntraVENous Q12H          Lab Review:     Recent Labs     04/05/24 1911 04/06/24  0216   WBC 8.1 8.0   HGB 14.3 13.1   HCT 45.2 41.7    295     Recent Labs     04/05/24 1911 04/06/24  0216    140   K 4.0 4.4    109*   CO2 30 29   BUN 3* 3*   MG  --  1.9   PHOS  --  3.0   ALT 21 16   INR  --  1.0     No results found for: \"GLUCPOC\"       Assessment / Plan:     Efraín Ruiz is a 53 y.o. male with a past medical history of schizophrenia and drug use who presented to the emergency room due to confusion and unknown intoxication.     Encephalopathy / Unknown intoxication: POA. Acute. Improved somewhat. Reports having taken an unknown pill. Uds neg  - 13 drug screen pending  - tele  - neuro checks     Great right toe cellulitis with concern for osteomyelitis: POA. Acute. Osteolysis on imaging  - MRI  - He isn't able to complete MRI 
4/9/2024 at 8:38 AM    
agitation.  Has intermittent hallucinations.  Psych was following.  Recommended oral haldol and cogentin.  Will cont IV haldol prn agitation    4) Dispo: patient is homeless.  Dispo is major issue.  CM following    **Prior records, notes, labs, radiology, and medications reviewed in [facility]**    Total time spent with patient care: 25 Minutes **I personally saw and examined the patient during this time period**                 Care Plan discussed with: Patient, nursing     Discussed:  Care Plan    Prophylaxis:  Lovenox    Disposition:  Homeless, dispo issues           ___________________________________________________    Attending Physician: Ras Otoole MD     
encephalopathy: now back to baseline.  Due to drug OD and underlying schizophrenia.  Neuro was following    3) Schizophrenia: has intermittent hallucinations.  Psych was following.  Recommended oral haldol and cogentin.  Will cont IV haldol prn agitation    4) Dispo: patient is homeless.  CM following    **Prior records, notes, labs, radiology, and medications reviewed in [facility]**    Total time spent with patient care: 30 Minutes **I personally saw and examined the patient during this time period**                 Care Plan discussed with: Patient, nursing, CM     Discussed:  Care Plan    Prophylaxis:  Lovenox    Disposition:  SNF/LTC vs long term placement vs family            ___________________________________________________    Attending Physician: Ras Otoole MD

## 2024-04-16 VITALS
TEMPERATURE: 98.4 F | HEIGHT: 70 IN | RESPIRATION RATE: 19 BRPM | SYSTOLIC BLOOD PRESSURE: 95 MMHG | DIASTOLIC BLOOD PRESSURE: 72 MMHG | BODY MASS INDEX: 24.34 KG/M2 | OXYGEN SATURATION: 97 % | WEIGHT: 170 LBS | HEART RATE: 67 BPM

## 2024-04-16 PROCEDURE — 6370000000 HC RX 637 (ALT 250 FOR IP): Performed by: INTERNAL MEDICINE

## 2024-04-16 PROCEDURE — 2580000003 HC RX 258: Performed by: INTERNAL MEDICINE

## 2024-04-16 PROCEDURE — 6370000000 HC RX 637 (ALT 250 FOR IP): Performed by: STUDENT IN AN ORGANIZED HEALTH CARE EDUCATION/TRAINING PROGRAM

## 2024-04-16 RX ORDER — BENZTROPINE MESYLATE 1 MG/1
1 TABLET ORAL 2 TIMES DAILY
Qty: 16 TABLET | Refills: 0 | Status: SHIPPED | OUTPATIENT
Start: 2024-04-16 | End: 2024-04-24

## 2024-04-16 RX ORDER — GABAPENTIN 100 MG/1
100 CAPSULE ORAL NIGHTLY
Qty: 15 CAPSULE | Refills: 0 | Status: SHIPPED | OUTPATIENT
Start: 2024-04-16 | End: 2024-05-01

## 2024-04-16 RX ORDER — HALOPERIDOL 5 MG/1
5 TABLET ORAL 2 TIMES DAILY
Qty: 120 TABLET | Refills: 3 | Status: SHIPPED | OUTPATIENT
Start: 2024-04-16

## 2024-04-16 RX ORDER — LACTOBACILLUS RHAMNOSUS GG 10B CELL
1 CAPSULE ORAL
Qty: 30 CAPSULE | Refills: 0 | Status: SHIPPED | OUTPATIENT
Start: 2024-04-16

## 2024-04-16 RX ORDER — AMOXICILLIN AND CLAVULANATE POTASSIUM 875; 125 MG/1; MG/1
1 TABLET, FILM COATED ORAL EVERY 12 HOURS SCHEDULED
Qty: 16 TABLET | Refills: 0 | Status: SHIPPED | OUTPATIENT
Start: 2024-04-16 | End: 2024-04-24

## 2024-04-16 RX ORDER — DOXYCYCLINE HYCLATE 100 MG
100 TABLET ORAL EVERY 12 HOURS SCHEDULED
Qty: 8 TABLET | Refills: 0 | Status: SHIPPED | OUTPATIENT
Start: 2024-04-16 | End: 2024-04-20

## 2024-04-16 RX ADMIN — Medication 1 CAPSULE: at 09:41

## 2024-04-16 RX ADMIN — AMOXICILLIN AND CLAVULANATE POTASSIUM 1 TABLET: 875; 125 TABLET, FILM COATED ORAL at 09:42

## 2024-04-16 RX ADMIN — HALOPERIDOL 5 MG: 5 TABLET ORAL at 09:42

## 2024-04-16 RX ADMIN — DOXYCYCLINE HYCLATE 100 MG: 100 TABLET, COATED ORAL at 09:42

## 2024-04-16 RX ADMIN — OXYCODONE 5 MG: 5 TABLET ORAL at 09:42

## 2024-04-16 RX ADMIN — SODIUM CHLORIDE, PRESERVATIVE FREE 10 ML: 5 INJECTION INTRAVENOUS at 09:43

## 2024-04-16 RX ADMIN — BENZTROPINE MESYLATE 1 MG: 1 TABLET ORAL at 09:42

## 2024-04-16 NOTE — DISCHARGE SUMMARY
Hospitalist Discharge Summary     Patient ID:  Efraín Ruiz  [id number]  53 y.o.  1970    Admit date: 4/5/2024    Discharge date and time: 4/16/2024    Admission Diagnoses: Encephalopathy [G93.40]  Open wound of right great toe, initial encounter [S91.101A]  Altered mental status, unspecified altered mental status type [R41.82]    Discharge Diagnoses:    Principal Problem (Resolved):    Encephalopathy  Active Problems:    Neuropathy    Sheltered homelessness  Resolved Problems:    Right hallux osteomyelitis (HCC)    Open wound of right great toe    Drug use         Hospital Course:   52 yo hx of schizophrenia, drug abuse, presented w/ AMS, drug OD, 1st toe gangrene and osteomyelitis       R 1st toe gangrene/osteomyelitis: Now stable.    Acute on chronic.    S/p R hallux amputation by Podiatry on 04/09.  Podiatry indicated that surgery was curative.  Wound Cx w/ MRSA and proteus.  Was on Vanc/CTX/Flagyl.    ID following, recommended oral Augmentin and Doxy for 7 more days, prescription sent to pt pharmacy requested   Started on spencer 100 QHS, to fu OP, and continue OP  Podiatry has eval the patient, and agrees with follow-up in 2 weeks after being discharged      Acute met encephalopathy: now back to baseline.  Due to drug OD and underlying schizophrenia.  Neuro was following     Schizophrenia: no agitation.  Has intermittent hallucinations.  Psych was following.  Recommended oral haldol and cogentin.  Will cont IV haldol prn agitation     Dispo: patient is homeless.  Dispo is major issue.  CM following, and placement is today        Imaging  No results found.     PCP: Luna Wharton MD     Consults: orthopedic surgery    Condition of patient at discharge: Stable    Discharge Exam:    Physical Exam:    Gen:  Well-developed, well-nourished, in no acute distress  HEENT:  Pink conjunctivae, PERRL, hearing intact to voice, moist mucous membranes  Neck:  Supple, without masses, thyroid non-tender  Resp:  No

## 2024-04-16 NOTE — PLAN OF CARE
Problem: Physical Therapy - Adult  Goal: By Discharge: Performs mobility at highest level of function for planned discharge setting.  See evaluation for individualized goals.  Description: FUNCTIONAL STATUS PRIOR TO ADMISSION: Pt unable to provide history due to impaired cognition. Per chart, pt recently residing at drug rehab facility, recently admitted to [facility] for R foot cellulitis treated with abx. PLOF with mobility unavailable at time of evaluation. Of note, pt with h/o schizophrenia.    Physical Therapy Goals  Initiated 4/6/2024  1.  Patient will move from supine to sit and sit to supine in bed with independence within 7 day(s).    2.  Patient will perform sit to stand with modified independence within 7 day(s).  3.  Patient will transfer from bed to chair and chair to bed with supervision/set-up using the least restrictive device within 7 day(s).  4.  Patient will ambulate with supervision/set-up for 150 feet with the least restrictive device within 7 day(s).     Outcome: Progressing     PHYSICAL THERAPY TREATMENT    Patient: Efraín Ruiz (53 y.o. male)  Date: 4/12/2024  Diagnosis: Encephalopathy [G93.40]  Open wound of right great toe, initial encounter [S91.101A]  Altered mental status, unspecified altered mental status type [R41.82] Encephalopathy  Procedure(s) (LRB):  RIGHT HALLUX  AMPUTATION (Right) 3 Days Post-Op  Precautions: Fall Risk, Weight Bearing, Isolation Right Lower Extremity Weight Bearing: Partial Weight Bearing (heel weight bearing with surgical shoe)                    ASSESSMENT:  Patient continues to benefit from skilled PT services and is slowly progressing towards goals. Patient presents with impaired safety awareness, decreased insight into deficits and recall of partial weight bearing precaution, impaired balance, and decreased participation/motivation. Patient completed bed mobility with independence and was re-educated on PWB status (no recall of heel only). Patient ambulated 
  Problem: Physical Therapy - Adult  Goal: By Discharge: Performs mobility at highest level of function for planned discharge setting.  See evaluation for individualized goals.  Description: FUNCTIONAL STATUS PRIOR TO ADMISSION: Pt unable to provide history due to impaired cognition. Per chart, pt recently residing at drug rehab facility, recently admitted to [facility] for R foot cellulitis treated with abx. PLOF with mobility unavailable at time of evaluation. Of note, pt with h/o schizophrenia.    Physical Therapy Goals  Initiated 4/6/2024  1.  Patient will move from supine to sit and sit to supine in bed with independence within 7 day(s).    2.  Patient will perform sit to stand with modified independence within 7 day(s).  3.  Patient will transfer from bed to chair and chair to bed with supervision/set-up using the least restrictive device within 7 day(s).  4.  Patient will ambulate with supervision/set-up for 150 feet with the least restrictive device within 7 day(s).     Outcome: Progressing    PHYSICAL THERAPY TREATMENT    Patient: Efraín Ruiz (53 y.o. male)  Date: 4/11/2024  Diagnosis: Encephalopathy [G93.40]  Open wound of right great toe, initial encounter [S91.101A]  Altered mental status, unspecified altered mental status type [R41.82] Encephalopathy  Procedure(s) (LRB):  RIGHT HALLUX  AMPUTATION (Right) 2 Days Post-Op  Precautions: Fall Risk, Weight Bearing, Isolation Right Lower Extremity Weight Bearing: Partial Weight Bearing (heel weight bearing with surgical shoe)                    ASSESSMENT:  Patient continues to benefit from skilled PT services and is progressing towards goals. Pt presents with no c/o pain, is IND with bed mobility, able to don/doff post op shoe on R foot independently. Instructed and reviewed HEP for b/l LE, verbalizes and demonstrates understanding.  Requires SBA for transfers and is able to ambulate ~150' with crutches, SBA/CGA, demos narrow URSZULA with step to gait, no instances 
  Problem: Physical Therapy - Adult  Goal: By Discharge: Performs mobility at highest level of function for planned discharge setting.  See evaluation for individualized goals.  Description: FUNCTIONAL STATUS PRIOR TO ADMISSION: Pt unable to provide history due to impaired cognition. Per chart, pt recently residing at drug rehab facility, recently admitted to [facility] for R foot cellulitis treated with abx. PLOF with mobility unavailable at time of evaluation. Of note, pt with h/o schizophrenia.    Physical Therapy Goals  Initiated 4/6/2024  1.  Patient will move from supine to sit and sit to supine in bed with independence within 7 day(s).    2.  Patient will perform sit to stand with modified independence within 7 day(s).  3.  Patient will transfer from bed to chair and chair to bed with supervision/set-up using the least restrictive device within 7 day(s).  4.  Patient will ambulate with supervision/set-up for 150 feet with the least restrictive device within 7 day(s).     Outcome: Progressing   PHYSICAL THERAPY TREATMENT    Patient: Efraín Ruiz (53 y.o. male)  Date: 4/9/2024  Diagnosis: Encephalopathy [G93.40]  Open wound of right great toe, initial encounter [S91.101A]  Altered mental status, unspecified altered mental status type [R41.82] Encephalopathy  Procedure(s) (LRB):  LEFT HALLUX  AMPUTATION (Left) Day of Surgery  Precautions: Fall Risk                      ASSESSMENT:  Patient continues to benefit from skilled PT services.Pt seen this AM.Pt supine to sit independently.Pt SBA sit to stand.Pt ambulated 30ft in room non WB on right with crutches CGA to SBA.Pt back to bed with SBA.Pt progressing with mobility.Continue goals.       PLAN:  Patient continues to benefit from skilled intervention to address the above impairments.  Continue treatment per established plan of care.      Recommendation for discharge: (in order for the patient to meet his/her long term goals): TBD    Other factors to consider for 
  Problem: Safety - Adult  Goal: Free from fall injury  4/10/2024 0316 by Brenda Ward RN  Outcome: Progressing  4/9/2024 2004 by Brenda Ward RN  Outcome: Progressing     Problem: Pain  Goal: Verbalizes/displays adequate comfort level or baseline comfort level  4/10/2024 0316 by Brenda Ward RN  Outcome: Progressing  4/9/2024 2004 by Brenda Ward RN  Outcome: Progressing     Problem: Physical Therapy - Adult  Goal: By Discharge: Performs mobility at highest level of function for planned discharge setting.  See evaluation for individualized goals.  Description: FUNCTIONAL STATUS PRIOR TO ADMISSION: Pt unable to provide history due to impaired cognition. Per chart, pt recently residing at drug rehab facility, recently admitted to [facility] for R foot cellulitis treated with abx. PLOF with mobility unavailable at time of evaluation. Of note, pt with h/o schizophrenia.    Physical Therapy Goals  Initiated 4/6/2024  1.  Patient will move from supine to sit and sit to supine in bed with independence within 7 day(s).    2.  Patient will perform sit to stand with modified independence within 7 day(s).  3.  Patient will transfer from bed to chair and chair to bed with supervision/set-up using the least restrictive device within 7 day(s).  4.  Patient will ambulate with supervision/set-up for 150 feet with the least restrictive device within 7 day(s).     4/9/2024 1457 by Epi Lee, PTA  Outcome: Progressing     Problem: Skin/Tissue Integrity  Goal: Absence of new skin breakdown  Description: 1.  Monitor for areas of redness and/or skin breakdown  2.  Assess vascular access sites hourly  3.  Every 4-6 hours minimum:  Change oxygen saturation probe site  4.  Every 4-6 hours:  If on nasal continuous positive airway pressure, respiratory therapy assess nares and determine need for appliance change or resting period.  4/10/2024 0316 by Brenda Ward 
  Problem: Safety - Adult  Goal: Free from fall injury  4/11/2024 2305 by Carolyn Rose RN  Outcome: Progressing  4/11/2024 1249 by Magdi Desai RN  Outcome: Progressing     Problem: Pain  Goal: Verbalizes/displays adequate comfort level or baseline comfort level  4/11/2024 2305 by Carolyn Rose, RN  Outcome: Progressing  4/11/2024 1249 by Magdi Desai, RN  Outcome: Progressing     Problem: Skin/Tissue Integrity  Goal: Absence of new skin breakdown  Description: 1.  Monitor for areas of redness and/or skin breakdown  2.  Assess vascular access sites hourly  3.  Every 4-6 hours minimum:  Change oxygen saturation probe site  4.  Every 4-6 hours:  If on nasal continuous positive airway pressure, respiratory therapy assess nares and determine need for appliance change or resting period.  Outcome: Progressing     Problem: Discharge Planning  Goal: Discharge to home or other facility with appropriate resources  4/11/2024 2305 by Carolyn Rose, RN  Outcome: Progressing  4/11/2024 1249 by Magdi Desai, RN  Outcome: Progressing     
  Problem: Safety - Adult  Goal: Free from fall injury  4/12/2024 1045 by Andra Dai RN  Outcome: [facility] Progressing  4/11/2024 2305 by Carolyn Rose RN  Outcome: Progressing     Problem: Pain  Goal: Verbalizes/displays adequate comfort level or baseline comfort level  4/12/2024 1045 by Andra Dai RN  Outcome: [facility] Progressing  4/11/2024 2305 by Carolyn Rose RN  Outcome: Progressing     Problem: Skin/Tissue Integrity  Goal: Absence of new skin breakdown  Description: 1.  Monitor for areas of redness and/or skin breakdown  2.  Assess vascular access sites hourly  3.  Every 4-6 hours minimum:  Change oxygen saturation probe site  4.  Every 4-6 hours:  If on nasal continuous positive airway pressure, respiratory therapy assess nares and determine need for appliance change or resting period.  4/12/2024 1045 by Andra Dai, RN  Outcome: [facility] Progressing  4/11/2024 2305 by Carolyn Rose RN  Outcome: Progressing     Problem: Discharge Planning  Goal: Discharge to home or other facility with appropriate resources  4/12/2024 1045 by Andra Dai RN  Outcome: [facility] Progressing  4/11/2024 2305 by Carolyn Rose RN  Outcome: Progressing     
  Problem: Safety - Adult  Goal: Free from fall injury  4/12/2024 2356 by Dee Mathur RN  Outcome: Progressing  4/12/2024 1045 by Andra Dai RN  Outcome: /[facility] Progressing     Problem: Pain  Goal: Verbalizes/displays adequate comfort level or baseline comfort level  4/12/2024 2356 by Dee Mathur RN  Outcome: Progressing  4/12/2024 1045 by Andra Dai RN  Outcome: /[facility] Progressing     Problem: Physical Therapy - Adult  Goal: By Discharge: Performs mobility at highest level of function for planned discharge setting.  See evaluation for individualized goals.  Description: FUNCTIONAL STATUS PRIOR TO ADMISSION: Pt unable to provide history due to impaired cognition. Per chart, pt recently residing at drug rehab facility, recently admitted to [facility] for R foot cellulitis treated with abx. PLOF with mobility unavailable at time of evaluation. Of note, pt with h/o schizophrenia.    Physical Therapy Goals  Initiated 4/6/2024  1.  Patient will move from supine to sit and sit to supine in bed with independence within 7 day(s).    2.  Patient will perform sit to stand with modified independence within 7 day(s).  3.  Patient will transfer from bed to chair and chair to bed with supervision/set-up using the least restrictive device within 7 day(s).  4.  Patient will ambulate with supervision/set-up for 150 feet with the least restrictive device within 7 day(s).     4/12/2024 1203 by Jose Spann, PT  Outcome: Progressing     Problem: Skin/Tissue Integrity  Goal: Absence of new skin breakdown  Description: 1.  Monitor for areas of redness and/or skin breakdown  2.  Assess vascular access sites hourly  3.  Every 4-6 hours minimum:  Change oxygen saturation probe site  4.  Every 4-6 hours:  If on nasal continuous positive airway pressure, respiratory therapy assess nares and determine need for appliance change or resting period.  4/12/2024 2356 by Dee Mathur RN  Outcome: Progressing  4/12/2024 
  Problem: Safety - Adult  Goal: Free from fall injury  4/13/2024 0953 by Andra Dai RN  Outcome: /[facility] Progressing  4/12/2024 2356 by Dee Mathur RN  Outcome: Progressing     Problem: Pain  Goal: Verbalizes/displays adequate comfort level or baseline comfort level  4/13/2024 0953 by Andra Dai RN  Outcome: /[facility] Progressing  4/12/2024 2356 by Dee Mathur RN  Outcome: Progressing     Problem: Skin/Tissue Integrity  Goal: Absence of new skin breakdown  Description: 1.  Monitor for areas of redness and/or skin breakdown  2.  Assess vascular access sites hourly  3.  Every 4-6 hours minimum:  Change oxygen saturation probe site  4.  Every 4-6 hours:  If on nasal continuous positive airway pressure, respiratory therapy assess nares and determine need for appliance change or resting period.  4/13/2024 0953 by Andra Dai RN  Outcome: /[facility] Progressing  4/12/2024 2356 by Dee Mathur RN  Outcome: Progressing     Problem: Discharge Planning  Goal: Discharge to home or other facility with appropriate resources  Outcome: /[facility] Progressing     Problem: Nutrition Deficit:  Goal: Optimize nutritional status  Outcome: /[facility] Progressing     
  Problem: Safety - Adult  Goal: Free from fall injury  4/14/2024 2233 by Anneliese Mireles RN  Outcome: Progressing  4/14/2024 0938 by Andra Dai RN  Outcome: [facility] Progressing     Problem: Pain  Goal: Verbalizes/displays adequate comfort level or baseline comfort level  4/14/2024 2233 by Anneliese Mireles RN  Outcome: Progressing  4/14/2024 0938 by Andra Dai, RN  Outcome: /[facility] Progressing     Problem: Skin/Tissue Integrity  Goal: Absence of new skin breakdown  Description: 1.  Monitor for areas of redness and/or skin breakdown  2.  Assess vascular access sites hourly  3.  Every 4-6 hours minimum:  Change oxygen saturation probe site  4.  Every 4-6 hours:  If on nasal continuous positive airway pressure, respiratory therapy assess nares and determine need for appliance change or resting period.  4/14/2024 2233 by Anneliese Mireles RN  Outcome: Progressing  4/14/2024 0938 by Andra Dai RN  Outcome: /[facility] Progressing     Problem: Discharge Planning  Goal: Discharge to home or other facility with appropriate resources  4/14/2024 2233 by Anneliese Mireles RN  Outcome: Progressing  4/14/2024 0938 by Andra Dai RN  Outcome: /[facility] Progressing     
  Problem: Safety - Adult  Goal: Free from fall injury  Outcome: /[facility] Progressing     Problem: Pain  Goal: Verbalizes/displays adequate comfort level or baseline comfort level  Outcome: HH/[facility] Progressing     Problem: Skin/Tissue Integrity  Goal: Absence of new skin breakdown  Description: 1.  Monitor for areas of redness and/or skin breakdown  2.  Assess vascular access sites hourly  3.  Every 4-6 hours minimum:  Change oxygen saturation probe site  4.  Every 4-6 hours:  If on nasal continuous positive airway pressure, respiratory therapy assess nares and determine need for appliance change or resting period.  Outcome: [facility] Progressing     Problem: Discharge Planning  Goal: Discharge to home or other facility with appropriate resources  Outcome: [facility] Progressing     Problem: Nutrition Deficit:  Goal: Optimize nutritional status  Outcome: HH/[facility] Progressing     
  Problem: Safety - Adult  Goal: Free from fall injury  Outcome: Progressing     Problem: Pain  Goal: Verbalizes/displays adequate comfort level or baseline comfort level  Outcome: Progressing     
  Problem: Safety - Adult  Goal: Free from fall injury  Outcome: Progressing     Problem: Pain  Goal: Verbalizes/displays adequate comfort level or baseline comfort level  Outcome: Progressing     Problem: Discharge Planning  Goal: Discharge to home or other facility with appropriate resources  Outcome: Progressing     
  Problem: Safety - Adult  Goal: Free from fall injury  Outcome: Progressing     Problem: Pain  Goal: Verbalizes/displays adequate comfort level or baseline comfort level  Outcome: Progressing     Problem: Physical Therapy - Adult  Goal: By Discharge: Performs mobility at highest level of function for planned discharge setting.  See evaluation for individualized goals.  Description: FUNCTIONAL STATUS PRIOR TO ADMISSION: Pt unable to provide history due to impaired cognition. Per chart, pt recently residing at drug rehab facility, recently admitted to [facility] for R foot cellulitis treated with abx. PLOF with mobility unavailable at time of evaluation. Of note, pt with h/o schizophrenia.    Physical Therapy Goals  Initiated 4/6/2024  1.  Patient will move from supine to sit and sit to supine in bed with independence within 7 day(s).    2.  Patient will perform sit to stand with modified independence within 7 day(s).  3.  Patient will transfer from bed to chair and chair to bed with supervision/set-up using the least restrictive device within 7 day(s).  4.  Patient will ambulate with supervision/set-up for 150 feet with the least restrictive device within 7 day(s).     4/10/2024 1322 by Jennifer Burden, PT  Outcome: Progressing     Problem: Skin/Tissue Integrity  Goal: Absence of new skin breakdown  Description: 1.  Monitor for areas of redness and/or skin breakdown  2.  Assess vascular access sites hourly  3.  Every 4-6 hours minimum:  Change oxygen saturation probe site  4.  Every 4-6 hours:  If on nasal continuous positive airway pressure, respiratory therapy assess nares and determine need for appliance change or resting period.  Outcome: Progressing     
  Problem: Safety - Adult  Goal: Free from fall injury  Outcome: Progressing     Problem: Pain  Goal: Verbalizes/displays adequate comfort level or baseline comfort level  Outcome: Progressing     Problem: Physical Therapy - Adult  Goal: By Discharge: Performs mobility at highest level of function for planned discharge setting.  See evaluation for individualized goals.  Description: FUNCTIONAL STATUS PRIOR TO ADMISSION: Pt unable to provide history due to impaired cognition. Per chart, pt recently residing at drug rehab facility, recently admitted to [facility] for R foot cellulitis treated with abx. PLOF with mobility unavailable at time of evaluation. Of note, pt with h/o schizophrenia.    Physical Therapy Goals  Initiated 4/6/2024  1.  Patient will move from supine to sit and sit to supine in bed with independence within 7 day(s).    2.  Patient will perform sit to stand with modified independence within 7 day(s).  3.  Patient will transfer from bed to chair and chair to bed with supervision/set-up using the least restrictive device within 7 day(s).  4.  Patient will ambulate with supervision/set-up for 150 feet with the least restrictive device within 7 day(s).     4/9/2024 1457 by Epi Lee, PTA  Outcome: Progressing     Problem: Skin/Tissue Integrity  Goal: Absence of new skin breakdown  Description: 1.  Monitor for areas of redness and/or skin breakdown  2.  Assess vascular access sites hourly  3.  Every 4-6 hours minimum:  Change oxygen saturation probe site  4.  Every 4-6 hours:  If on nasal continuous positive airway pressure, respiratory therapy assess nares and determine need for appliance change or resting period.  Outcome: Progressing     
  Problem: Safety - Adult  Goal: Free from fall injury  Outcome: Progressing     Problem: Pain  Goal: Verbalizes/displays adequate comfort level or baseline comfort level  Outcome: Progressing     Problem: Skin/Tissue Integrity  Goal: Absence of new skin breakdown  Description: 1.  Monitor for areas of redness and/or skin breakdown  2.  Assess vascular access sites hourly  3.  Every 4-6 hours minimum:  Change oxygen saturation probe site  4.  Every 4-6 hours:  If on nasal continuous positive airway pressure, respiratory therapy assess nares and determine need for appliance change or resting period.  Outcome: Progressing     
  Problem: Safety - Adult  Goal: Free from fall injury  Outcome: Progressing     Problem: Pain  Goal: Verbalizes/displays adequate comfort level or baseline comfort level  Outcome: Progressing     Problem: Skin/Tissue Integrity  Goal: Absence of new skin breakdown  Description: 1.  Monitor for areas of redness and/or skin breakdown  2.  Assess vascular access sites hourly  3.  Every 4-6 hours minimum:  Change oxygen saturation probe site  4.  Every 4-6 hours:  If on nasal continuous positive airway pressure, respiratory therapy assess nares and determine need for appliance change or resting period.  Outcome: Progressing     Problem: Discharge Planning  Goal: Discharge to home or other facility with appropriate resources  Outcome: Progressing  Flowsheets (Taken 4/16/2024 2261)  Discharge to home or other facility with appropriate resources:   Identify barriers to discharge with patient and caregiver   Arrange for needed discharge resources and transportation as appropriate   Identify discharge learning needs (meds, wound care, etc)   Refer to discharge planning if patient needs post-hospital services based on physician order or complex needs related to functional status, cognitive ability or social support system     Problem: Nutrition Deficit:  Goal: Optimize nutritional status  Outcome: Progressing     
meet his/her long term goals): Continue to assess pending progress     Other factors to consider for discharge: high risk for falls and concern for safely navigating or managing the home environment    IF patient discharges home will need the following DME: continuing to assess with progress       SUBJECTIVE:   Patient stated, \"my foot hurts.\"    OBJECTIVE DATA SUMMARY:   Critical Behavior:          Functional Mobility Training:  Bed Mobility:  Bed Mobility Training  Supine to Sit: Supervision  Sit to Supine: Modified independent  Scooting: Modified independent  Transfers:  Transfer Training  Interventions: Safety awareness training;Verbal cues;Weight shifting training/pressure relief  Sit to Stand: Contact-guard assistance;Assist X1  Stand to Sit: Contact-guard assistance;Assist X1  Balance:  Balance  Sitting: Intact  Standing: Impaired  Standing - Static: Constant support;Good  Standing - Dynamic: Constant support;Fair   Ambulation/Gait Training:     Gait  Overall Level of Assistance: Contact-guard assistance;Assist X1;Additional time  Distance (ft): 50 Feet  Assistive Device: Crutches;Walker, rolling  Interventions: Safety awareness training;Verbal cues;Tactile cues  Base of Support: Shift to left  Speed/D[place]: Pace decreased (< 100 feet/min)  Gait Abnormalities: Antalgic;Decreased step clearance;Step to gait  Neuro Re-Education:                    Pain Ratin/10   Pain Intervention(s):   rest and elevation    Activity Tolerance:   Good    After treatment:   Patient left in no apparent distress in bed, Call bell within reach, and Bed/ chair alarm activated      COMMUNICATION/EDUCATION:   The patient's plan of care was discussed with: registered nurse    Patient Education  Education Given To: Patient  Education Provided: Role of Therapy;Plan of Care;Orientation;Fall Prevention Strategies  Education Method: Verbal  Barriers to Learning: Cognition  Education Outcome: Continued education needed      Rayna MELARA 
Sly ROBBINS, Chery ROBBINS. Association of AM-PAC \"6-Clicks\" Basic Mobility and Daily Activity Scores With Discharge Destination. Phys Ther. 2021 Apr 4;101(4):btkp533. doi: 10.1093/ptj/[email or web address]. PMID: 72320832.  3. José ROBBINS, Olivia D, Andra S, Mendez K, Angelique S. Activity Measure for Post-Acute Care \"6-Clicks\" Basic Mobility Scores Predict Discharge Destination After Acute Care Hospitalization in Select Patient Groups: A Retrospective, Observational Study. Arch Rehabil Res Clin Transl. 2022 Jul 16;4(3):805169. doi: 10.1016/j.arrct.2022.202123. PMID: 11435956; PMCID: [id number].  4. Michelle RIGGS, Phyllis S, Michelle W, Tanisha P. AM-PAC Short Forms Manual 4.0. Revised 2/2020.                                                                                                                                                                                                                               Pain Rating:  None reported  Pain Intervention(s):       Activity Tolerance:   Good    After treatment:   Patient left in no apparent distress sitting up in chair, Call bell within reach, and Bed/ chair alarm activated    COMMUNICATION/EDUCATION:   The patient's plan of care was discussed with: registered nurse         Thank you for this referral.  Jennifer Burden PT,DPT,NCS,CLT  Minutes: 16       
                 Pain Rating:  No c/o pain    Activity Tolerance:   Good    After treatment:   Patient left in no apparent distress in bed, Call bell within reach, Bed/ chair alarm activated, Side rails x3, and Patient offloaded in partial right side lying for pressure relief    COMMUNICATION/EDUCATION:   The patient's plan of care was discussed with: registered nurse    Patient Education  Education Given To: Patient  Education Provided: Role of Therapy;Plan of Care;Orientation;Fall Prevention Strategies  Education Method: Verbal;Demonstration  Barriers to Learning: Cognition  Education Outcome: Continued education needed    Thank you for this referral.  Shabana Cope, PT  Minutes: 17      Physical Therapy Evaluation Charge Determination   History Examination Presentation Decision-Making   MEDIUM  Complexity : 1-2 comorbidities / personal factors will impact the outcome/ POC  LOW Complexity : 1-2 Standardized tests and measures addressing body structure, function, activity limitation and / or participation in recreation  MEDIUM Complexity : Evolving with changing characteristics  AM-PAC  LOW    Based on the above components, the patient evaluation is determined to be of the following complexity level: Low   
Xray Chest 1 View- PORTABLE-Urgent

## 2024-04-16 NOTE — CARE COORDINATION
4/11/2024   CARE MANAGEMENT NOTE:  Pt transferred from an alternate room on the 5th floor.  Pt was admitted with acute met enceph., and right 1st toe osteo.  Pt also with schizophrenia, hx of drug and alcohol abuse.  Pt is homeless and he was admitted from a drug rehab facility in [place].    [facility]  is Renetta Campbell ([phone])  [employer] contact is Brandon Wilson ([phone])    RUR 10%    Transition Plan of Care:  Neurology, ID,Podiatry - pt is s/p right hallux amputation on 4/9  Psych NP evaluated pt on 4/6  PT eval complete; pt ambulated 100 feet on 4/10  Pt has a sitter for safety and risk of elopement  Dispo to be determined  Medicaid transport is probable    CM will continue to follow pt until discharged.  [place]  
4/12/2024  2:45 PM  Care Management Progress Note      ICD-10-CM    1. Altered mental status, unspecified altered mental status type  R41.82       2. Open wound of right great toe, initial encounter  S91.101A           RUR:  9%  Risk Level: [x]Low []Moderate []High    Transition of care plan:  Awaiting medical clearance and DC order. PT/OT following. Cultures pending, and ID following. Podiatry following.  TBD pending treatment needs - Pt with history of IV drug use and is homeless. If IV abx are required at DC, pt may need to go to [facility] vs LTC. Pt is insured with no income. If oral abx are required, pt may either return to the substance abuse treatment center he resided at previously or to a shelter. CM spoke with [place] Care Transitions Coordinator, Mode Wilson, [phone], and provided an update. Mode reported that she had spoken to the pt earlier in the week, and the pt reported he may not want to return the treatment facility if oral abx are recommended as he may rather go to a shelter. CM to address pt preferences when ID recommendations are identified.   Outpatient follow-up.  Medicaid transport required.     
4/15/2024  3:26 PM  Care Management Progress Note      ICD-10-CM    1. Neuropathy  G62.9 gabapentin (NEURONTIN) 100 MG capsule      2. Altered mental status, unspecified altered mental status type  R41.82       3. Open wound of right great toe, initial encounter  S91.101A       4. Right hallux osteomyelitis (HCC)  M86.9           RUR:  10%  Risk Level: [x]Low []Moderate []High    Transition of care plan:  Discharge pending dispo. Podiatry following.   Treatment program - [facility]. Steffany reported she has spoken with pt via p/c, and he is refusing to return to the substance abuse program he was previously. CM confirmed this with pt, and pt reported that he had no family he can stay with. Pt reported he will go to a shelter if needed, but would like to avoid that if possible. Pt expressed interest into going to the [facility], and Steffany notified. After talking with admissions, Steffany reported the Crisis Stabilization Unit will accept pt, and can admit pt tomorrow. AVS and MAR sent via SAFEMAIL to Steffany, at [email or web address]. [place] Care Transitions Coordinator notified via HIPAA compliant voicemail (Mode Wilson ). Fany Ruth (), [facility] , updated via p/c and reports she will continue to follow the pt while at the CSU.   Outpatient follow-up.  Medicaid transport vs medical transport required.     
4/16/2024  10:49 AM  Care Management Progress Note      ICD-10-CM    1. Neuropathy  G62.9 gabapentin (NEURONTIN) 100 MG capsule     DISCONTINUED: gabapentin (NEURONTIN) 100 MG capsule      2. Altered mental status, unspecified altered mental status type  R41.82       3. Open wound of right great toe, initial encounter  S91.101A       4. Right hallux osteomyelitis (HCC)  M86.9           RUR:  9%  Risk Level: [x]Low []Moderate []High    Transition of care plan:  Discharge order submitted. Pt has medically cleared.   Crisis stabilization - CM received p/c from [facility] Steffany COLUNGA , who reported [facility] is able to admit pt today - nursing does not need to call report. Pt notified and agreeable. Attending notified to send prescriptions to facility's pharmacy ([facility], [address] [place] [address] , [facility] [id number] / p:  / f: ).    Facility Info - [facility]  [id number]  , [facility] [address]   - Adrienne Menjivar - [phone]  Outpatient follow-up.  Hospital to Home Ambulatory Transport arranged for 1:00 PM. Due to need to have pt arrive in a timely manner and due to pt's treatment needs, transport fee to be billed to case management per CM supervisor ($55).        04/10/24 1414   Service Assessment   Patient Orientation Alert and Oriented   Cognition Alert   History Provided By Patient;Child/Family;Medical Record   Primary Caregiver Self  (had recently been in a chemical rehab placement)   Support Systems Other (Comment)  ( Renetta Michelle [phone],  Care Coordinator with Mimi Wilson [phone])   Patient's Healthcare Decision Maker is: Legal Next of Kin  (sister and brother as listed in AD section)   Prior Functional Level Independent in ADLs/IADLs  (Independent using crutches.)   Current Functional Level Independent in ADLs/IADLs   Can patient return to prior living arrangement 
[place].  Message left by this CM on her vm)   Ability to make needs known: Good   Family able to assist with home care needs: No   Would you like for me to discuss the discharge plan with any other family members/significant others, and if so, who? Yes  (gave permission to call both sister and brother.  Brother is in [place] and gave number of Renetta, sister's number is restricted.)   Financial Resources Medicaid   Community Resources Other (Comment)  (Not fully clear role of Renetta luke as the brother had a phone number but not a card.  He said that she has helped with things like placement and he knows she plans to come up here.  Care Coordinator Brandon called nursing station/ plans to visit)   Social/Functional History   Lives With Other (comment)  (homeless)   Home Equipment Crutches   Active  No   Mode of Transportation Bus   Services At/After Discharge   Transition of Care Consult (CM Consult) Other  (therapy recommending HH for therapy needs at discharge.  ID recommendations pending.)   Mode of Transport at Discharge Other (see comment)  (patient said he often uses the bus, depending on address at discharge transportation plan TBD)     Met with patient who said that he knows he might need more than he usually does if he needs IV therapy after discharge, and he is not sure what his disposition would be as a result, if that is needed.  Homeless, he has on occasion stayed in a hotel and gave permission for this cm to call his brother and sister.  Sister's phone is restricted, the brother said that he would talk with her further as well, had a number of a Community  ( he called her, though does not have a card indicating where she works or title) Renetta Michelle [phone] who helps with things like placement and planning.  Call placed to her and message left.     While on the phone with Mr. Mike Sky (Brother) [phone], CM received a call from a Care Coordinator with

## 2024-05-02 ENCOUNTER — OFFICE VISIT (OUTPATIENT)
Age: 54
End: 2024-05-02
Payer: MEDICAID

## 2024-05-02 VITALS
HEIGHT: 70 IN | BODY MASS INDEX: 21.1 KG/M2 | DIASTOLIC BLOOD PRESSURE: 64 MMHG | HEART RATE: 88 BPM | OXYGEN SATURATION: 93 % | WEIGHT: 147.4 LBS | SYSTOLIC BLOOD PRESSURE: 97 MMHG | RESPIRATION RATE: 20 BRPM | TEMPERATURE: 97.8 F

## 2024-05-02 DIAGNOSIS — S98.111A AMPUTATION OF RIGHT GREAT TOE (HCC): Primary | ICD-10-CM

## 2024-05-02 PROCEDURE — 99213 OFFICE O/P EST LOW 20 MIN: CPT | Performed by: PODIATRIST

## 2024-05-02 RX ORDER — FOLIC ACID 1 MG/1
1 TABLET ORAL DAILY
COMMUNITY
Start: 2024-03-25

## 2024-05-02 ASSESSMENT — PATIENT HEALTH QUESTIONNAIRE - PHQ9
SUM OF ALL RESPONSES TO PHQ QUESTIONS 1-9: 0
2. FEELING DOWN, DEPRESSED OR HOPELESS: NOT AT ALL
SUM OF ALL RESPONSES TO PHQ QUESTIONS 1-9: 0
SUM OF ALL RESPONSES TO PHQ QUESTIONS 1-9: 0
SUM OF ALL RESPONSES TO PHQ9 QUESTIONS 1 & 2: 0
SUM OF ALL RESPONSES TO PHQ QUESTIONS 1-9: 0
1. LITTLE INTEREST OR PLEASURE IN DOING THINGS: NOT AT ALL

## 2024-05-02 NOTE — PROGRESS NOTES
Identified pt with two pt identifiers (name and ). Reviewed chart in preparation for visit and have obtained necessary documentation.    Efraín Ruiz is a 53 y.o. male  Chief Complaint   Patient presents with    Post-Op Check     BP 97/64 (Site: Left Upper Arm, Position: Sitting, Cuff Size: Small Adult)   Pulse 88   Temp 97.8 °F (36.6 °C) (Oral)   Resp 20   Ht 1.778 m (5' 10\")   Wt 66.9 kg (147 lb 6.4 oz)   SpO2 93%   BMI 21.15 kg/m²     1. Have you been to the ER, urgent care clinic since your last visit?  Hospitalized since your last visit?yes -     2. Have you seen or consulted any other health care providers outside of the [facility] since your last visit?  Include any pap smears or colon screening. no

## 2024-05-11 ASSESSMENT — ENCOUNTER SYMPTOMS
ABDOMINAL PAIN: 0
DIARRHEA: 0
VOMITING: 0
SHORTNESS OF BREATH: 0

## (undated) DEVICE — TUBING, SUCTION, 1/4" X 12', STRAIGHT: Brand: MEDLINE

## (undated) DEVICE — STOCKINETTE,DOUBLE PLY,6X48,STERILE: Brand: MEDLINE

## (undated) DEVICE — PADDING CAST 4 INX5 YD STRL

## (undated) DEVICE — DRESSING GZ W3XL16IN CELOS ACETT OIL EMUL N ADH

## (undated) DEVICE — TUBING SUCT 10FR MAL ALUM SHFT FN CAP VENT UNIV CONN W/ OBT

## (undated) DEVICE — GAUZE,SPONGE,FLUFF,6"X6.75",STRL,5/TRAY: Brand: MEDLINE

## (undated) DEVICE — APPLICATOR MEDICATED 26 CC SOLUTION HI LT ORNG CHLORAPREP

## (undated) DEVICE — BANDAGE COMPR W4INXL5YD WHT BGE POLY COT M E WRP WV HK AND

## (undated) DEVICE — BASIC SINGLE BASIN-LF: Brand: MEDLINE INDUSTRIES, INC.

## (undated) DEVICE — GLOVE ORANGE PI 7 1/2   MSG9075

## (undated) DEVICE — 1010 S-DRAPE TOWEL DRAPE 10/BX: Brand: STERI-DRAPE™

## (undated) DEVICE — BANDAGE COMPR W3INXL5YD WHT BGE POLY COT M E WRP WV HK AND

## (undated) DEVICE — SOLUTION IRRIG 1000ML STRL H2O USP PLAS POUR BTL

## (undated) DEVICE — GLOVE ORTHO 8   MSG9480

## (undated) DEVICE — SUTURE ETHILON SZ 4-0 L18IN NONABSORBABLE BLK L19MM PS-2 3/8 1667H

## (undated) DEVICE — GLOVE SURG SZ 8 L12IN FNGR THK79MIL GRN LTX FREE

## (undated) DEVICE — PENCIL ES CRD L10FT HND SWCHING ROCK SWCH W/ EDGE COAT BLDE

## (undated) DEVICE — SOLUTION IRRIG 500ML 0.9% SOD CHLO USP POUR PLAS BTL

## (undated) DEVICE — ELECTRODE PT RET AD L9FT HI MOIST COND ADH HYDRGEL CORDED

## (undated) DEVICE — THE STERILE LIGHT HANDLE COVER IS USED WITH STERIS SURGICAL LIGHTING AND VISUALIZATION SYSTEMS.

## (undated) DEVICE — MINOR EXTREMITY PACK: Brand: MEDLINE INDUSTRIES, INC.

## (undated) DEVICE — DRAPE,U/ SHT,SPLIT,PLAS,STERIL: Brand: MEDLINE